# Patient Record
Sex: MALE
[De-identification: names, ages, dates, MRNs, and addresses within clinical notes are randomized per-mention and may not be internally consistent; named-entity substitution may affect disease eponyms.]

---

## 2017-03-26 ENCOUNTER — HOSPITAL ENCOUNTER (INPATIENT)
Dept: HOSPITAL 31 - C.ER | Age: 52
LOS: 4 days | Discharge: HOME | DRG: 885 | End: 2017-03-30
Attending: PSYCHIATRY & NEUROLOGY | Admitting: PSYCHIATRY & NEUROLOGY
Payer: COMMERCIAL

## 2017-03-26 VITALS — BODY MASS INDEX: 33 KG/M2

## 2017-03-26 DIAGNOSIS — F33.2: Primary | ICD-10-CM
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
## 2017-03-26 DIAGNOSIS — F17.210: ICD-10-CM

## 2017-03-26 DIAGNOSIS — R45.851: ICD-10-CM

## 2017-03-26 DIAGNOSIS — I10: ICD-10-CM

## 2017-03-26 DIAGNOSIS — F10.220: ICD-10-CM

## 2017-03-26 DIAGNOSIS — R45.850: ICD-10-CM

## 2017-03-26 DIAGNOSIS — F10.230: ICD-10-CM

## 2017-03-26 DIAGNOSIS — E78.00: ICD-10-CM

## 2017-03-26 DIAGNOSIS — Z79.84: ICD-10-CM

## 2017-03-26 DIAGNOSIS — E11.9: ICD-10-CM

## 2017-03-26 DIAGNOSIS — E66.3: ICD-10-CM

## 2017-03-26 LAB
ALBUMIN/GLOB SERPL: 1.7 {RATIO} (ref 1–2.1)
ALP SERPL-CCNC: 58 U/L (ref 38–126)
ALT SERPL-CCNC: 57 U/L (ref 21–72)
AST SERPL-CCNC: 48 U/L (ref 17–59)
BASOPHILS # BLD AUTO: 0 K/UL (ref 0–0.2)
BASOPHILS NFR BLD: 0.4 % (ref 0–2)
BILIRUB SERPL-MCNC: 0.1 MG/DL (ref 0.2–1.3)
BILIRUB UR-MCNC: NEGATIVE MG/DL
BUN SERPL-MCNC: 10 MG/DL (ref 9–20)
CALCIUM SERPL-MCNC: 9.3 MG/DL (ref 8.6–10.4)
CHLORIDE SERPL-SCNC: 95 MMOL/L (ref 98–107)
CO2 SERPL-SCNC: 30 MMOL/L (ref 22–30)
EOSINOPHIL # BLD AUTO: 0 K/UL (ref 0–0.7)
EOSINOPHIL NFR BLD: 0.4 % (ref 0–4)
ERYTHROCYTE [DISTWIDTH] IN BLOOD BY AUTOMATED COUNT: 15.8 % (ref 11.5–14.5)
ETHANOL SERPL-MCNC: 296 MG/DL (ref 0–10)
GLOBULIN SER-MCNC: 2.5 GM/DL (ref 2.2–3.9)
GLUCOSE SERPL-MCNC: 166 MG/DL (ref 75–110)
GLUCOSE UR STRIP-MCNC: NORMAL MG/DL
HCT VFR BLD CALC: 42.1 % (ref 35–51)
KETONES UR STRIP-MCNC: (no result) MG/DL
LEUKOCYTE ESTERASE UR-ACNC: (no result) LEU/UL
LYMPHOCYTES # BLD AUTO: 2.2 K/UL (ref 1–4.3)
LYMPHOCYTES NFR BLD AUTO: 42.7 % (ref 20–40)
MCH RBC QN AUTO: 29.8 PG (ref 27–31)
MCHC RBC AUTO-ENTMCNC: 33.3 G/DL (ref 33–37)
MCV RBC AUTO: 89.6 FL (ref 80–94)
MONOCYTES # BLD: 0.4 K/UL (ref 0–0.8)
MONOCYTES NFR BLD: 7.9 % (ref 0–10)
NRBC BLD AUTO-RTO: 0.1 % (ref 0–2)
PH UR STRIP: 6 [PH] (ref 5–8)
PLATELET # BLD: 347 K/UL (ref 130–400)
PMV BLD AUTO: 7.1 FL (ref 7.2–11.7)
POTASSIUM SERPL-SCNC: 3.7 MMOL/L (ref 3.6–5.2)
PROT SERPL-MCNC: 6.8 G/DL (ref 6.3–8.3)
PROT UR STRIP-MCNC: NEGATIVE MG/DL
RBC # UR STRIP: NEGATIVE /UL
SODIUM SERPL-SCNC: 142 MMOL/L (ref 132–148)
SP GR UR STRIP: 1.01 (ref 1–1.03)
UROBILINOGEN UR-MCNC: NORMAL MG/DL (ref 0.2–1)
WBC # BLD AUTO: 5.2 K/UL (ref 4.8–10.8)
WBC #/AREA URNS HPF: < 1 /HPF (ref 0–5)

## 2017-03-27 VITALS — OXYGEN SATURATION: 96 %

## 2017-03-27 PROCEDURE — GZ56ZZZ INDIVIDUAL PSYCHOTHERAPY, SUPPORTIVE: ICD-10-PCS | Performed by: PSYCHIATRY & NEUROLOGY

## 2017-03-27 PROCEDURE — GZHZZZZ GROUP PSYCHOTHERAPY: ICD-10-PCS | Performed by: PSYCHIATRY & NEUROLOGY

## 2017-03-27 PROCEDURE — GZ3ZZZZ MEDICATION MANAGEMENT: ICD-10-PCS | Performed by: PSYCHIATRY & NEUROLOGY

## 2017-03-27 PROCEDURE — HZ2ZZZZ DETOXIFICATION SERVICES FOR SUBSTANCE ABUSE TREATMENT: ICD-10-PCS | Performed by: PSYCHIATRY & NEUROLOGY

## 2017-03-27 RX ADMIN — PANTOPRAZOLE SODIUM SCH MG: 40 TABLET, DELAYED RELEASE ORAL at 11:13

## 2017-03-28 RX ADMIN — Medication SCH TAB: at 09:47

## 2017-03-28 RX ADMIN — PANTOPRAZOLE SODIUM SCH MG: 40 TABLET, DELAYED RELEASE ORAL at 09:46

## 2017-03-29 VITALS — RESPIRATION RATE: 19 BRPM

## 2017-03-29 RX ADMIN — PANTOPRAZOLE SODIUM SCH MG: 40 TABLET, DELAYED RELEASE ORAL at 10:14

## 2017-03-29 RX ADMIN — Medication SCH TAB: at 10:12

## 2017-03-30 VITALS — DIASTOLIC BLOOD PRESSURE: 75 MMHG | HEART RATE: 103 BPM | SYSTOLIC BLOOD PRESSURE: 116 MMHG | TEMPERATURE: 97.3 F

## 2017-03-30 RX ADMIN — Medication SCH TAB: at 09:51

## 2017-04-14 ENCOUNTER — HOSPITAL ENCOUNTER (OUTPATIENT)
Dept: HOSPITAL 31 - C.ER | Age: 52
Setting detail: OBSERVATION
LOS: 3 days | Discharge: HOME | End: 2017-04-17
Attending: INTERNAL MEDICINE | Admitting: INTERNAL MEDICINE
Payer: COMMERCIAL

## 2017-04-14 VITALS — RESPIRATION RATE: 20 BRPM

## 2017-04-14 VITALS — BODY MASS INDEX: 33 KG/M2

## 2017-04-14 DIAGNOSIS — E66.9: ICD-10-CM

## 2017-04-14 DIAGNOSIS — J44.1: Primary | ICD-10-CM

## 2017-04-14 DIAGNOSIS — Z23: ICD-10-CM

## 2017-04-14 DIAGNOSIS — E78.00: ICD-10-CM

## 2017-04-14 DIAGNOSIS — I10: ICD-10-CM

## 2017-04-14 DIAGNOSIS — G47.33: ICD-10-CM
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
## 2017-04-14 DIAGNOSIS — F17.200: ICD-10-CM

## 2017-04-14 DIAGNOSIS — E11.9: ICD-10-CM

## 2017-04-14 DIAGNOSIS — F19.10: ICD-10-CM

## 2017-04-14 DIAGNOSIS — F14.10: ICD-10-CM

## 2017-04-14 DIAGNOSIS — F10.120: ICD-10-CM

## 2017-04-14 LAB
ALBUMIN/GLOB SERPL: 1.6 {RATIO} (ref 1–2.1)
ALP SERPL-CCNC: 76 U/L (ref 38–126)
ALT SERPL-CCNC: 55 U/L (ref 21–72)
ARTERIAL PATENCY WRIST A: (no result)
ARTERIAL PATENCY WRIST A: (no result)
AST SERPL-CCNC: 54 U/L (ref 17–59)
BACTERIA #/AREA URNS HPF: (no result) /[HPF]
BASOPHILS # BLD AUTO: 0 K/UL (ref 0–0.2)
BASOPHILS NFR BLD: 0 % (ref 0–2)
BILIRUB SERPL-MCNC: 0.3 MG/DL (ref 0.2–1.3)
BILIRUB UR-MCNC: NEGATIVE MG/DL
BUN SERPL-MCNC: 14 MG/DL (ref 9–20)
CALCIUM SERPL-MCNC: 8 MG/DL (ref 8.6–10.4)
CHLORIDE SERPL-SCNC: 88 MMOL/L (ref 98–107)
CO2 SERPL-SCNC: 24 MMOL/L (ref 22–30)
DRAW SITE: (no result)
DRAW SITE: (no result)
EOSINOPHIL # BLD AUTO: 0 K/UL (ref 0–0.7)
EOSINOPHIL NFR BLD: 0 % (ref 0–4)
ERYTHROCYTE [DISTWIDTH] IN BLOOD BY AUTOMATED COUNT: 15.4 % (ref 11.5–14.5)
ETHANOL SERPL-MCNC: 200 MG/DL (ref 0–10)
GLOBULIN SER-MCNC: 2.8 GM/DL (ref 2.2–3.9)
GLUCOSE SERPL-MCNC: 172 MG/DL (ref 75–110)
GLUCOSE UR STRIP-MCNC: NORMAL MG/DL
HCT VFR BLD CALC: 40.3 % (ref 35–51)
KETONES UR STRIP-MCNC: NEGATIVE MG/DL
LEUKOCYTE ESTERASE UR-ACNC: (no result) LEU/UL
LYMPHOCYTES # BLD AUTO: 1.2 K/UL (ref 1–4.3)
LYMPHOCYTES NFR BLD AUTO: 15.6 % (ref 20–40)
MCH RBC QN AUTO: 30.3 PG (ref 27–31)
MCHC RBC AUTO-ENTMCNC: 33.3 G/DL (ref 33–37)
MCV RBC AUTO: 90.9 FL (ref 80–94)
MONOCYTES # BLD: 0.5 K/UL (ref 0–0.8)
MONOCYTES NFR BLD: 7.2 % (ref 0–10)
NRBC BLD AUTO-RTO: 0.2 % (ref 0–2)
PH UR STRIP: 6 [PH] (ref 5–8)
PLATELET # BLD: 219 K/UL (ref 130–400)
PMV BLD AUTO: 7.4 FL (ref 7.2–11.7)
POTASSIUM SERPL-SCNC: 4.7 MMOL/L (ref 3.6–5.2)
PROT SERPL-MCNC: 7.4 G/DL (ref 6.3–8.3)
PROT UR STRIP-MCNC: NEGATIVE MG/DL
RBC # UR STRIP: NEGATIVE /UL
RBC #/AREA URNS HPF: < 1 /HPF (ref 0–3)
SODIUM SERPL-SCNC: 131 MMOL/L (ref 132–148)
SP GR UR STRIP: 1.01 (ref 1–1.03)
UROBILINOGEN UR-MCNC: NORMAL MG/DL (ref 0.2–1)
WBC # BLD AUTO: 7.5 K/UL (ref 4.8–10.8)
WBC #/AREA URNS HPF: < 1 /HPF (ref 0–5)

## 2017-04-14 PROCEDURE — 82803 BLOOD GASES ANY COMBINATION: CPT

## 2017-04-14 PROCEDURE — 36600 WITHDRAWAL OF ARTERIAL BLOOD: CPT

## 2017-04-14 PROCEDURE — 81001 URINALYSIS AUTO W/SCOPE: CPT

## 2017-04-14 PROCEDURE — 96366 THER/PROPH/DIAG IV INF ADDON: CPT

## 2017-04-14 PROCEDURE — 96367 TX/PROPH/DG ADDL SEQ IV INF: CPT

## 2017-04-14 PROCEDURE — 82948 REAGENT STRIP/BLOOD GLUCOSE: CPT

## 2017-04-14 PROCEDURE — 36415 COLL VENOUS BLD VENIPUNCTURE: CPT

## 2017-04-14 PROCEDURE — 94640 AIRWAY INHALATION TREATMENT: CPT

## 2017-04-14 PROCEDURE — 83735 ASSAY OF MAGNESIUM: CPT

## 2017-04-14 PROCEDURE — 94660 CPAP INITIATION&MGMT: CPT

## 2017-04-14 PROCEDURE — 99285 EMERGENCY DEPT VISIT HI MDM: CPT

## 2017-04-14 PROCEDURE — 85730 THROMBOPLASTIN TIME PARTIAL: CPT

## 2017-04-14 PROCEDURE — 96376 TX/PRO/DX INJ SAME DRUG ADON: CPT

## 2017-04-14 PROCEDURE — 80053 COMPREHEN METABOLIC PANEL: CPT

## 2017-04-14 PROCEDURE — 84100 ASSAY OF PHOSPHORUS: CPT

## 2017-04-14 PROCEDURE — 96365 THER/PROPH/DIAG IV INF INIT: CPT

## 2017-04-14 PROCEDURE — 97116 GAIT TRAINING THERAPY: CPT

## 2017-04-14 PROCEDURE — 87040 BLOOD CULTURE FOR BACTERIA: CPT

## 2017-04-14 PROCEDURE — 93005 ELECTROCARDIOGRAM TRACING: CPT

## 2017-04-14 PROCEDURE — 96361 HYDRATE IV INFUSION ADD-ON: CPT

## 2017-04-14 PROCEDURE — 85025 COMPLETE CBC W/AUTO DIFF WBC: CPT

## 2017-04-14 PROCEDURE — 90732 PPSV23 VACC 2 YRS+ SUBQ/IM: CPT

## 2017-04-14 PROCEDURE — 80048 BASIC METABOLIC PNL TOTAL CA: CPT

## 2017-04-14 PROCEDURE — 97162 PT EVAL MOD COMPLEX 30 MIN: CPT

## 2017-04-14 PROCEDURE — 71010: CPT

## 2017-04-14 PROCEDURE — 96372 THER/PROPH/DIAG INJ SC/IM: CPT

## 2017-04-14 PROCEDURE — 96374 THER/PROPH/DIAG INJ IV PUSH: CPT

## 2017-04-15 LAB
BASOPHILS # BLD AUTO: 0 K/UL (ref 0–0.2)
BASOPHILS NFR BLD: 0.2 % (ref 0–2)
BUN SERPL-MCNC: 11 MG/DL (ref 9–20)
CALCIUM SERPL-MCNC: 7.8 MG/DL (ref 8.6–10.4)
CHLORIDE SERPL-SCNC: 92 MMOL/L (ref 98–107)
CO2 SERPL-SCNC: 33 MMOL/L (ref 22–30)
EOSINOPHIL # BLD AUTO: 0 K/UL (ref 0–0.7)
EOSINOPHIL NFR BLD: 0.2 % (ref 0–4)
ERYTHROCYTE [DISTWIDTH] IN BLOOD BY AUTOMATED COUNT: 15.6 % (ref 11.5–14.5)
GLUCOSE SERPL-MCNC: 159 MG/DL (ref 75–110)
HCT VFR BLD CALC: 39.4 % (ref 35–51)
LYMPHOCYTES # BLD AUTO: 0.9 K/UL (ref 1–4.3)
LYMPHOCYTES NFR BLD AUTO: 18.4 % (ref 20–40)
MAGNESIUM SERPL-MCNC: 1.7 MG/DL (ref 1.6–2.3)
MCH RBC QN AUTO: 30.2 PG (ref 27–31)
MCHC RBC AUTO-ENTMCNC: 33.2 G/DL (ref 33–37)
MCV RBC AUTO: 90.9 FL (ref 80–94)
MONOCYTES # BLD: 0.6 K/UL (ref 0–0.8)
MONOCYTES NFR BLD: 11.9 % (ref 0–10)
NRBC BLD AUTO-RTO: 0.4 % (ref 0–2)
PHOSPHATE SERPL-MCNC: 2.6 MG/DL (ref 2.5–4.5)
PLATELET # BLD: 164 K/UL (ref 130–400)
PMV BLD AUTO: 7.5 FL (ref 7.2–11.7)
POTASSIUM SERPL-SCNC: 4.1 MMOL/L (ref 3.6–5.2)
SODIUM SERPL-SCNC: 135 MMOL/L (ref 132–148)
WBC # BLD AUTO: 4.8 K/UL (ref 4.8–10.8)

## 2017-04-15 RX ADMIN — GLIPIZIDE SCH MG: 5 TABLET ORAL at 09:57

## 2017-04-15 RX ADMIN — IPRATROPIUM BROMIDE AND ALBUTEROL SULFATE SCH ML: .5; 3 SOLUTION RESPIRATORY (INHALATION) at 19:17

## 2017-04-15 RX ADMIN — HUMAN INSULIN SCH UNIT: 100 INJECTION, SOLUTION SUBCUTANEOUS at 11:57

## 2017-04-15 RX ADMIN — Medication SCH TAB: at 17:38

## 2017-04-15 RX ADMIN — GLIPIZIDE SCH MG: 5 TABLET ORAL at 17:38

## 2017-04-15 RX ADMIN — PANTOPRAZOLE SODIUM SCH MG: 40 TABLET, DELAYED RELEASE ORAL at 09:59

## 2017-04-15 RX ADMIN — HUMAN INSULIN SCH UNIT: 100 INJECTION, SOLUTION SUBCUTANEOUS at 07:58

## 2017-04-15 RX ADMIN — HUMAN INSULIN SCH: 100 INJECTION, SOLUTION SUBCUTANEOUS at 22:03

## 2017-04-15 RX ADMIN — IPRATROPIUM BROMIDE AND ALBUTEROL SULFATE SCH ML: .5; 3 SOLUTION RESPIRATORY (INHALATION) at 07:52

## 2017-04-15 RX ADMIN — HUMAN INSULIN SCH UNIT: 100 INJECTION, SOLUTION SUBCUTANEOUS at 17:38

## 2017-04-15 RX ADMIN — IPRATROPIUM BROMIDE AND ALBUTEROL SULFATE SCH ML: .5; 3 SOLUTION RESPIRATORY (INHALATION) at 13:45

## 2017-04-16 RX ADMIN — IPRATROPIUM BROMIDE AND ALBUTEROL SULFATE SCH ML: .5; 3 SOLUTION RESPIRATORY (INHALATION) at 13:13

## 2017-04-16 RX ADMIN — PANTOPRAZOLE SODIUM SCH MG: 40 TABLET, DELAYED RELEASE ORAL at 10:37

## 2017-04-16 RX ADMIN — IPRATROPIUM BROMIDE AND ALBUTEROL SULFATE SCH ML: .5; 3 SOLUTION RESPIRATORY (INHALATION) at 08:39

## 2017-04-16 RX ADMIN — GLIPIZIDE SCH MG: 5 TABLET ORAL at 17:35

## 2017-04-16 RX ADMIN — IPRATROPIUM BROMIDE AND ALBUTEROL SULFATE SCH ML: .5; 3 SOLUTION RESPIRATORY (INHALATION) at 01:24

## 2017-04-16 RX ADMIN — Medication SCH TAB: at 10:37

## 2017-04-16 RX ADMIN — GLIPIZIDE SCH MG: 5 TABLET ORAL at 10:37

## 2017-04-16 RX ADMIN — HUMAN INSULIN SCH UNIT: 100 INJECTION, SOLUTION SUBCUTANEOUS at 08:27

## 2017-04-16 RX ADMIN — IPRATROPIUM BROMIDE AND ALBUTEROL SULFATE SCH ML: .5; 3 SOLUTION RESPIRATORY (INHALATION) at 21:18

## 2017-04-17 VITALS
SYSTOLIC BLOOD PRESSURE: 143 MMHG | HEART RATE: 90 BPM | OXYGEN SATURATION: 97 % | DIASTOLIC BLOOD PRESSURE: 92 MMHG | TEMPERATURE: 97.7 F

## 2017-04-17 RX ADMIN — Medication SCH TAB: at 09:41

## 2017-04-17 RX ADMIN — IPRATROPIUM BROMIDE AND ALBUTEROL SULFATE SCH ML: .5; 3 SOLUTION RESPIRATORY (INHALATION) at 08:43

## 2017-04-17 RX ADMIN — IPRATROPIUM BROMIDE AND ALBUTEROL SULFATE SCH: .5; 3 SOLUTION RESPIRATORY (INHALATION) at 15:10

## 2017-04-17 RX ADMIN — GLIPIZIDE SCH MG: 5 TABLET ORAL at 17:00

## 2017-04-17 RX ADMIN — PANTOPRAZOLE SODIUM SCH MG: 40 TABLET, DELAYED RELEASE ORAL at 09:42

## 2017-04-17 RX ADMIN — IPRATROPIUM BROMIDE AND ALBUTEROL SULFATE SCH ML: .5; 3 SOLUTION RESPIRATORY (INHALATION) at 01:14

## 2017-04-17 RX ADMIN — GLIPIZIDE SCH MG: 5 TABLET ORAL at 09:40

## 2017-07-14 ENCOUNTER — HOSPITAL ENCOUNTER (INPATIENT)
Dept: HOSPITAL 31 - C.ER | Age: 52
LOS: 3 days | Discharge: LEFT BEFORE DISCHARGE | DRG: 918 | End: 2017-07-17
Attending: INTERNAL MEDICINE | Admitting: INTERNAL MEDICINE
Payer: COMMERCIAL

## 2017-07-14 VITALS — BODY MASS INDEX: 33 KG/M2

## 2017-07-14 DIAGNOSIS — F17.210: ICD-10-CM

## 2017-07-14 DIAGNOSIS — G47.30: ICD-10-CM

## 2017-07-14 DIAGNOSIS — Z79.84: ICD-10-CM

## 2017-07-14 DIAGNOSIS — E11.9: ICD-10-CM

## 2017-07-14 DIAGNOSIS — F10.230: ICD-10-CM

## 2017-07-14 DIAGNOSIS — I10: ICD-10-CM

## 2017-07-14 DIAGNOSIS — F33.2: ICD-10-CM

## 2017-07-14 DIAGNOSIS — E78.00: ICD-10-CM

## 2017-07-14 DIAGNOSIS — T46.4X2A: Primary | ICD-10-CM
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
## 2017-07-14 LAB
ALBUMIN SERPL-MCNC: 4 G/DL (ref 3.5–5)
ALBUMIN/GLOB SERPL: 1.5 {RATIO} (ref 1–2.1)
ALT SERPL-CCNC: 43 U/L (ref 21–72)
APAP SERPL-MCNC: < 10 UG/ML (ref 10–30)
AST SERPL-CCNC: 21 U/L (ref 17–59)
BASOPHILS # BLD AUTO: 0 K/UL (ref 0–0.2)
BASOPHILS NFR BLD: 0.1 % (ref 0–2)
BILIRUB UR-MCNC: NEGATIVE MG/DL
BUN SERPL-MCNC: 13 MG/DL (ref 9–20)
CALCIUM SERPL-MCNC: 7.8 MG/DL (ref 8.6–10.4)
EOSINOPHIL # BLD AUTO: 0 K/UL (ref 0–0.7)
EOSINOPHIL NFR BLD: 0.3 % (ref 0–4)
ERYTHROCYTE [DISTWIDTH] IN BLOOD BY AUTOMATED COUNT: 17 % (ref 11.5–14.5)
GFR NON-AFRICAN AMERICAN: > 60
GLUCOSE UR STRIP-MCNC: NORMAL MG/DL
HGB BLD-MCNC: 14.8 G/DL (ref 12–18)
LEUKOCYTE ESTERASE UR-ACNC: (no result) LEU/UL
LYMPHOCYTES # BLD AUTO: 1.9 K/UL (ref 1–4.3)
LYMPHOCYTES NFR BLD AUTO: 21.4 % (ref 20–40)
MCH RBC QN AUTO: 29.7 PG (ref 27–31)
MCHC RBC AUTO-ENTMCNC: 33.2 G/DL (ref 33–37)
MCV RBC AUTO: 89.5 FL (ref 80–94)
MONOCYTES # BLD: 0.7 K/UL (ref 0–0.8)
MONOCYTES NFR BLD: 8.2 % (ref 0–10)
NEUTROPHILS # BLD: 6.1 K/UL (ref 1.8–7)
NEUTROPHILS NFR BLD AUTO: 70 % (ref 50–75)
NRBC BLD AUTO-RTO: 0 % (ref 0–2)
PH UR STRIP: 5 [PH] (ref 5–8)
PLATELET # BLD: 292 K/UL (ref 130–400)
PMV BLD AUTO: 7.6 FL (ref 7.2–11.7)
PROT UR STRIP-MCNC: (no result) MG/DL
RBC # BLD AUTO: 4.98 MIL/UL (ref 4.4–5.9)
RBC # UR STRIP: NEGATIVE /UL
SALICYLATE: < 1 MG/DL 1
SP GR UR STRIP: 1.02 (ref 1–1.03)
URINE NITRATE: NEGATIVE
UROBILINOGEN UR-MCNC: NORMAL MG/DL (ref 0.2–1)
WBC # BLD AUTO: 8.7 K/UL (ref 4.8–10.8)

## 2017-07-14 RX ADMIN — PANTOPRAZOLE SODIUM SCH MG: 40 TABLET, DELAYED RELEASE ORAL at 10:25

## 2017-07-15 RX ADMIN — Medication SCH TAB: at 10:47

## 2017-07-15 RX ADMIN — PANTOPRAZOLE SODIUM SCH MG: 40 TABLET, DELAYED RELEASE ORAL at 10:47

## 2017-07-16 RX ADMIN — DIVALPROEX SODIUM SCH MG: 250 TABLET, DELAYED RELEASE ORAL at 18:44

## 2017-07-16 RX ADMIN — Medication SCH TAB: at 10:48

## 2017-07-16 RX ADMIN — PANTOPRAZOLE SODIUM SCH MG: 40 TABLET, DELAYED RELEASE ORAL at 10:48

## 2017-07-16 RX ADMIN — DIVALPROEX SODIUM SCH MG: 250 TABLET, DELAYED RELEASE ORAL at 13:10

## 2017-07-17 VITALS
TEMPERATURE: 98 F | DIASTOLIC BLOOD PRESSURE: 44 MMHG | RESPIRATION RATE: 20 BRPM | SYSTOLIC BLOOD PRESSURE: 118 MMHG | HEART RATE: 98 BPM

## 2017-07-17 VITALS — OXYGEN SATURATION: 95 %

## 2017-07-17 RX ADMIN — PANTOPRAZOLE SODIUM SCH MG: 40 TABLET, DELAYED RELEASE ORAL at 09:10

## 2017-07-17 RX ADMIN — Medication SCH TAB: at 09:10

## 2017-07-17 RX ADMIN — DIVALPROEX SODIUM SCH MG: 250 TABLET, DELAYED RELEASE ORAL at 09:12

## 2017-10-21 ENCOUNTER — HOSPITAL ENCOUNTER (INPATIENT)
Dept: HOSPITAL 31 - C.ER | Age: 52
LOS: 3 days | Discharge: HOME | DRG: 313 | End: 2017-10-24
Attending: INTERNAL MEDICINE | Admitting: INTERNAL MEDICINE
Payer: COMMERCIAL

## 2017-10-21 VITALS — BODY MASS INDEX: 33 KG/M2

## 2017-10-21 DIAGNOSIS — J44.9: ICD-10-CM

## 2017-10-21 DIAGNOSIS — E11.9: ICD-10-CM

## 2017-10-21 DIAGNOSIS — F17.200: ICD-10-CM

## 2017-10-21 DIAGNOSIS — G47.30: ICD-10-CM

## 2017-10-21 DIAGNOSIS — F10.129: ICD-10-CM

## 2017-10-21 DIAGNOSIS — F32.9: ICD-10-CM

## 2017-10-21 DIAGNOSIS — Z79.82: ICD-10-CM

## 2017-10-21 DIAGNOSIS — Z87.01: ICD-10-CM

## 2017-10-21 DIAGNOSIS — E78.00: ICD-10-CM

## 2017-10-21 DIAGNOSIS — R07.89: Primary | ICD-10-CM

## 2017-10-21 DIAGNOSIS — I10: ICD-10-CM

## 2017-10-21 LAB
ALBUMIN/GLOB SERPL: 2.3 {RATIO} (ref 1–2.1)
ALP SERPL-CCNC: 57 U/L (ref 38–126)
ALT SERPL-CCNC: 44 U/L (ref 21–72)
AST SERPL-CCNC: 48 U/L (ref 17–59)
BASOPHILS # BLD AUTO: 0 K/UL (ref 0–0.2)
BASOPHILS NFR BLD: 0.3 % (ref 0–2)
BILIRUB SERPL-MCNC: 0.7 MG/DL (ref 0.2–1.3)
BILIRUB UR-MCNC: NEGATIVE MG/DL
BUN SERPL-MCNC: 25 MG/DL (ref 9–20)
CALCIUM SERPL-MCNC: 8.6 MG/DL (ref 8.6–10.4)
CHLORIDE SERPL-SCNC: 97 MMOL/L (ref 98–107)
CO2 SERPL-SCNC: 19 MMOL/L (ref 22–30)
EOSINOPHIL # BLD AUTO: 0 K/UL (ref 0–0.7)
EOSINOPHIL NFR BLD: 0.5 % (ref 0–4)
ERYTHROCYTE [DISTWIDTH] IN BLOOD BY AUTOMATED COUNT: 16.2 % (ref 11.5–14.5)
ETHANOL SERPL-MCNC: 286 MG/DL (ref 0–10)
GLOBULIN SER-MCNC: 2.1 GM/DL (ref 2.2–3.9)
GLUCOSE SERPL-MCNC: 118 MG/DL (ref 75–110)
GLUCOSE UR STRIP-MCNC: NORMAL MG/DL
HCT VFR BLD CALC: 44.1 % (ref 35–51)
KETONES UR STRIP-MCNC: (no result) MG/DL
LEUKOCYTE ESTERASE UR-ACNC: (no result) LEU/UL
LYMPHOCYTES # BLD AUTO: 2.8 K/UL (ref 1–4.3)
LYMPHOCYTES NFR BLD AUTO: 31.8 % (ref 20–40)
MCH RBC QN AUTO: 31.4 PG (ref 27–31)
MCHC RBC AUTO-ENTMCNC: 34.1 G/DL (ref 33–37)
MCV RBC AUTO: 92 FL (ref 80–94)
MONOCYTES # BLD: 0.7 K/UL (ref 0–0.8)
MONOCYTES NFR BLD: 8.2 % (ref 0–10)
NRBC BLD AUTO-RTO: 0.1 % (ref 0–2)
PH UR STRIP: 5 [PH] (ref 5–8)
PLATELET # BLD: 265 K/UL (ref 130–400)
PMV BLD AUTO: 7.7 FL (ref 7.2–11.7)
POTASSIUM SERPL-SCNC: 4 MMOL/L (ref 3.6–5.2)
PROT SERPL-MCNC: 7 G/DL (ref 6.3–8.3)
PROT UR STRIP-MCNC: NEGATIVE MG/DL
RBC # UR STRIP: NEGATIVE /UL
RBC #/AREA URNS HPF: < 1 /HPF (ref 0–3)
SODIUM SERPL-SCNC: 136 MMOL/L (ref 132–148)
SP GR UR STRIP: 1.01 (ref 1–1.03)
UROBILINOGEN UR-MCNC: NORMAL MG/DL (ref 0.2–1)
WBC # BLD AUTO: 8.9 K/UL (ref 4.8–10.8)
WBC #/AREA URNS HPF: < 1 /HPF (ref 0–5)

## 2017-10-21 RX ADMIN — INSULIN ASPART SCH: 100 INJECTION, SOLUTION INTRAVENOUS; SUBCUTANEOUS at 22:02

## 2017-10-22 RX ADMIN — ENOXAPARIN SODIUM SCH MG: 40 INJECTION SUBCUTANEOUS at 09:57

## 2017-10-22 RX ADMIN — INSULIN ASPART SCH UNIT: 100 INJECTION, SOLUTION INTRAVENOUS; SUBCUTANEOUS at 17:35

## 2017-10-22 RX ADMIN — INSULIN ASPART SCH: 100 INJECTION, SOLUTION INTRAVENOUS; SUBCUTANEOUS at 21:49

## 2017-10-22 RX ADMIN — ASCORBIC ACID, VITAMIN A PALMITATE, CHOLECALCIFEROL, THIAMINE HYDROCHLORIDE, RIBOFLAVIN-5 PHOSPHATE SODIUM, PYRIDOXINE HYDROCHLORIDE, NIACINAMIDE, DEXPANTHENOL, ALPHA-TOCOPHEROL ACETATE, VITAMIN K1, FOLIC ACID, BIOTIN, CYANOCOBALAMIN SCH MLS/HR: 200; 3300; 200; 6; 3.6; 6; 40; 15; 10; 150; 600; 60; 5 INJECTION, SOLUTION INTRAVENOUS at 10:07

## 2017-10-22 RX ADMIN — INSULIN ASPART SCH UNIT: 100 INJECTION, SOLUTION INTRAVENOUS; SUBCUTANEOUS at 12:00

## 2017-10-22 RX ADMIN — INSULIN ASPART SCH: 100 INJECTION, SOLUTION INTRAVENOUS; SUBCUTANEOUS at 07:30

## 2017-10-23 VITALS — RESPIRATION RATE: 20 BRPM

## 2017-10-23 LAB
ALBUMIN/GLOB SERPL: 2 {RATIO} (ref 1–2.1)
ALP SERPL-CCNC: 69 U/L (ref 38–126)
ALT SERPL-CCNC: 33 U/L (ref 21–72)
AST SERPL-CCNC: 20 U/L (ref 17–59)
BILIRUB SERPL-MCNC: 0.6 MG/DL (ref 0.2–1.3)
BUN SERPL-MCNC: 16 MG/DL (ref 9–20)
CALCIUM SERPL-MCNC: 8.5 MG/DL (ref 8.6–10.4)
CHLORIDE SERPL-SCNC: 99 MMOL/L (ref 98–107)
CO2 SERPL-SCNC: 25 MMOL/L (ref 22–30)
ERYTHROCYTE [DISTWIDTH] IN BLOOD BY AUTOMATED COUNT: 15.7 % (ref 11.5–14.5)
GLOBULIN SER-MCNC: 2 GM/DL (ref 2.2–3.9)
GLUCOSE SERPL-MCNC: 162 MG/DL (ref 75–110)
HCT VFR BLD CALC: 40.9 % (ref 35–51)
MCH RBC QN AUTO: 31.2 PG (ref 27–31)
MCHC RBC AUTO-ENTMCNC: 34.1 G/DL (ref 33–37)
MCV RBC AUTO: 91.5 FL (ref 80–94)
PLATELET # BLD: 189 K/UL (ref 130–400)
PMV BLD AUTO: 7.6 FL (ref 7.2–11.7)
POTASSIUM SERPL-SCNC: 3.8 MMOL/L (ref 3.6–5.2)
PROT SERPL-MCNC: 5.9 G/DL (ref 6.3–8.3)
SODIUM SERPL-SCNC: 134 MMOL/L (ref 132–148)
WBC # BLD AUTO: 4.6 K/UL (ref 4.8–10.8)

## 2017-10-23 RX ADMIN — ENOXAPARIN SODIUM SCH MG: 40 INJECTION SUBCUTANEOUS at 14:47

## 2017-10-23 RX ADMIN — INSULIN ASPART SCH: 100 INJECTION, SOLUTION INTRAVENOUS; SUBCUTANEOUS at 07:45

## 2017-10-23 RX ADMIN — INSULIN ASPART SCH UNIT: 100 INJECTION, SOLUTION INTRAVENOUS; SUBCUTANEOUS at 16:50

## 2017-10-23 RX ADMIN — INSULIN ASPART SCH: 100 INJECTION, SOLUTION INTRAVENOUS; SUBCUTANEOUS at 12:45

## 2017-10-23 RX ADMIN — ASCORBIC ACID, VITAMIN A PALMITATE, CHOLECALCIFEROL, THIAMINE HYDROCHLORIDE, RIBOFLAVIN-5 PHOSPHATE SODIUM, PYRIDOXINE HYDROCHLORIDE, NIACINAMIDE, DEXPANTHENOL, ALPHA-TOCOPHEROL ACETATE, VITAMIN K1, FOLIC ACID, BIOTIN, CYANOCOBALAMIN SCH MLS/HR: 200; 3300; 200; 6; 3.6; 6; 40; 15; 10; 150; 600; 60; 5 INJECTION, SOLUTION INTRAVENOUS at 14:47

## 2017-10-23 RX ADMIN — INSULIN ASPART SCH: 100 INJECTION, SOLUTION INTRAVENOUS; SUBCUTANEOUS at 22:00

## 2017-10-24 VITALS — HEART RATE: 87 BPM

## 2017-10-24 VITALS — SYSTOLIC BLOOD PRESSURE: 124 MMHG | DIASTOLIC BLOOD PRESSURE: 79 MMHG | OXYGEN SATURATION: 95 % | TEMPERATURE: 97.6 F

## 2017-10-24 RX ADMIN — ENOXAPARIN SODIUM SCH MG: 40 INJECTION SUBCUTANEOUS at 09:08

## 2017-10-24 RX ADMIN — INSULIN ASPART SCH UNIT: 100 INJECTION, SOLUTION INTRAVENOUS; SUBCUTANEOUS at 08:15

## 2017-12-08 ENCOUNTER — HOSPITAL ENCOUNTER (INPATIENT)
Dept: HOSPITAL 31 - C.ER | Age: 52
LOS: 3 days | Discharge: HOME | DRG: 897 | End: 2017-12-11
Attending: PSYCHIATRY & NEUROLOGY | Admitting: PSYCHIATRY & NEUROLOGY
Payer: COMMERCIAL

## 2017-12-08 VITALS — OXYGEN SATURATION: 95 %

## 2017-12-08 DIAGNOSIS — F10.230: Primary | ICD-10-CM

## 2017-12-08 DIAGNOSIS — E11.9: ICD-10-CM

## 2017-12-08 DIAGNOSIS — F31.63: ICD-10-CM

## 2017-12-08 DIAGNOSIS — R45.851: ICD-10-CM

## 2017-12-08 DIAGNOSIS — F41.1: ICD-10-CM

## 2017-12-08 DIAGNOSIS — F10.220: ICD-10-CM

## 2017-12-08 DIAGNOSIS — I10: ICD-10-CM
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
## 2017-12-08 LAB
ALBUMIN/GLOB SERPL: 1.9 {RATIO} (ref 1–2.1)
ALP SERPL-CCNC: 122 U/L (ref 38–126)
ALT SERPL-CCNC: 51 U/L (ref 21–72)
AST SERPL-CCNC: 28 U/L (ref 17–59)
BASOPHILS # BLD AUTO: 0 K/UL (ref 0–0.2)
BASOPHILS NFR BLD: 0.2 % (ref 0–2)
BILIRUB SERPL-MCNC: 0.4 MG/DL (ref 0.2–1.3)
BILIRUB UR-MCNC: NEGATIVE MG/DL
BUN SERPL-MCNC: 19 MG/DL (ref 9–20)
CALCIUM SERPL-MCNC: 8.7 MG/DL (ref 8.6–10.4)
CHLORIDE SERPL-SCNC: 100 MMOL/L (ref 98–107)
CO2 SERPL-SCNC: 24 MMOL/L (ref 22–30)
EOSINOPHIL # BLD AUTO: 0.1 K/UL (ref 0–0.7)
EOSINOPHIL NFR BLD: 0.9 % (ref 0–4)
ERYTHROCYTE [DISTWIDTH] IN BLOOD BY AUTOMATED COUNT: 14.4 % (ref 11.5–14.5)
ETHANOL SERPL-MCNC: 257 MG/DL (ref 0–10)
GLOBULIN SER-MCNC: 2.3 GM/DL (ref 2.2–3.9)
GLUCOSE SERPL-MCNC: 234 MG/DL (ref 75–110)
GLUCOSE UR STRIP-MCNC: (no result) MG/DL
HCT VFR BLD CALC: 43.8 % (ref 35–51)
KETONES UR STRIP-MCNC: (no result) MG/DL
LEUKOCYTE ESTERASE UR-ACNC: (no result) LEU/UL
LYMPHOCYTES # BLD AUTO: 1.4 K/UL (ref 1–4.3)
LYMPHOCYTES NFR BLD AUTO: 22.8 % (ref 20–40)
MCH RBC QN AUTO: 31.3 PG (ref 27–31)
MCHC RBC AUTO-ENTMCNC: 34 G/DL (ref 33–37)
MCV RBC AUTO: 91.9 FL (ref 80–94)
MONOCYTES # BLD: 0.4 K/UL (ref 0–0.8)
MONOCYTES NFR BLD: 7.1 % (ref 0–10)
NRBC BLD AUTO-RTO: 0.2 % (ref 0–2)
PH UR STRIP: 6 [PH] (ref 5–8)
PLATELET # BLD: 231 K/UL (ref 130–400)
PMV BLD AUTO: 7.8 FL (ref 7.2–11.7)
POTASSIUM SERPL-SCNC: 3.9 MMOL/L (ref 3.6–5.2)
PROT SERPL-MCNC: 6.5 G/DL (ref 6.3–8.3)
PROT UR STRIP-MCNC: NEGATIVE MG/DL
RBC # UR STRIP: NEGATIVE /UL
SODIUM SERPL-SCNC: 142 MMOL/L (ref 132–148)
SP GR UR STRIP: 1 (ref 1–1.03)
UROBILINOGEN UR-MCNC: NORMAL MG/DL (ref 0.2–1)
WBC # BLD AUTO: 6.1 K/UL (ref 4.8–10.8)
WBC #/AREA URNS HPF: < 1 /HPF (ref 0–5)

## 2017-12-08 PROCEDURE — HZ2ZZZZ DETOXIFICATION SERVICES FOR SUBSTANCE ABUSE TREATMENT: ICD-10-PCS | Performed by: PSYCHIATRY & NEUROLOGY

## 2017-12-08 RX ADMIN — INSULIN GLARGINE SCH UNIT: 100 INJECTION, SOLUTION SUBCUTANEOUS at 21:30

## 2017-12-08 NOTE — PCM.BM
<Sundeep Morgan - Last Filed: 12/08/17 18:44>





Treatment Plan Problems





- Problems identified on initial assessmt


  ** Depression


Date Initiated: 12/08/17


Time Initiated: 17:30


Assessment reference: NA


Status: Active





  ** Alcohol abuse


Date Initiated: 12/08/17


Time Initiated: 17:30


Assessment reference: NA


Status: Active





Treatment assets and liabiliti


Patient Assests: cooperative, self-reliant, ADL independent, good support system

, financial stabiity


Patient Liabilities: physical pain, substance abuse (Alcohol), medical problems





- Milieu Protocol


Maintain good personal hygiene: daily Encourage regular showers, daily Remind 

patient to perform daily oral care


Conduct patient checks and document Observation sheet: Q15 minutes


Maintain personal safety: every shift Educate patient to report safety concerns 

to staff, every shift Monitor environment for contraband/sharps


Medication safety: Monitor for expected outcome, potential side effects: every 

shift, Assess barriers to learning: every shift, Assess readiness for 

medication education: every shift





<Dalia Brewster - Last Filed: 12/11/17 10:26>





Discharge/Continuing Care





- Education Needs


Education Needs: Patient Medication, Patient Coping Skills





- Discharge


Discharge Criteria: Tolerates medication w/o severe side effects, Reduction of 

target symptoms

## 2017-12-08 NOTE — C.PDOC
History Of Present Illness


52 year old male, with PMHx of depression, anxiety, and alcohol abuse, presents 

to ED for evaluation of depression and anxiety. Pt admits to drinking alcohol 

today. Denies any SI, HI, or other active physical complaints at this time.





Time Seen by Provider: 12/08/17 12:42


Chief Complaint (Nursing): Psychiatric Evaluation


History Per: Patient


History/Exam Limitations: no limitations


Onset/Duration Of Symptoms: Gradual


Current Symptoms Are (Timing): Still Present


Suicide/Self Injury Attempted (Context): None


Modifying Factor(s): Alcohol


Severity: None


Pain Scale Rating Of: 0


Associated Symptoms: Anxiety, Depression.  denies: Suicidal Thoughts, Suicidal 

Plan


Involuntary Hold By: None


Recent travel outside of the [place]: No


Additional History Per: Patient





Past Medical History


Reviewed: Historical Data, Nursing Documentation, Vital Signs


Vital Signs: 


 Last Vital Signs











Temp  97.8 F   12/08/17 12:20


 


Pulse  102 H  12/08/17 14:07


 


Resp  18   12/08/17 14:07


 


BP  102/83   12/08/17 14:07


 


Pulse Ox  92 L  12/08/17 15:19














- Medical History


PMH: Anxiety, Depression


Family History: States: Unknown Family Hx





- Social History


Hx Alcohol Use: Yes


Hx Substance Use: No





- Immunization History


Hx Tetanus Toxoid Vaccination: No


Hx Influenza Vaccination: No


Hx Pneumococcal Vaccination: No





Review Of Systems


Except As Marked, All Systems Reviewed And Found Negative.


Constitutional: Negative for: Fever, Chills


Cardiovascular: Negative for: Chest Pain, Palpitations


Respiratory: Negative for: Shortness of Breath


Gastrointestinal: Negative for: Nausea, Vomiting, Abdominal Pain


Neurological: Negative for: Headache, Dizziness


Psych: Positive for: Anxiety, Depression.  Negative for: Suicidal ideation





Physical Exam





- Physical Exam


Appears: Non-toxic, No Acute Distress, Other (morbidly obese)


Skin: Normal Color, Warm, Dry


Head: Atraumatic, Normacephalic


Eye(s): bilateral: Normal Inspection


Oral Mucosa: Moist, Other (EtOH on breath)


Neck: Normal ROM, Supple


Chest: Symmetrical


Cardiovascular: Rhythm Regular, No Murmur


Respiratory: Normal Breath Sounds, No Rales, No Rhonchi, No Wheezing


Gastrointestinal/Abdominal: Soft, No Tenderness


Extremity: Normal ROM


Neurological/Psych: Oriented x3, Normal Speech


Gait: Steady





ED Course And Treatment





- Laboratory Results


Result Diagrams: 


 12/08/17 13:04





 12/08/17 13:04


Lab Interpretation: Abnormal (etoh 257 H)


O2 Sat by Pulse Oximetry: 92


Reevaluation Time: 16:54


Reassessment Condition: Improved





- Physician Consult Information


Outcome Of Conversation: 1700: d/w Crisis, ok to 5E, Psych





Medical Decision Making


Medical Decision Making: 


Blood work, UA ordered and reviewed. 











Disposition


Doctor Will See Patient In The: Hospital


Counseled Patient/Family Regarding: Studies Performed, Diagnosis





- Disposition


Disposition: HOSPITALIZED


Disposition Time: 16:55


Condition: GOOD


Forms:  [employer] (English)





- Clinical Impression


Clinical Impression: 


 Alcohol abuse, Manic bipolar I disorder








- Scribe Statement


The provider has reviewed the documentation as recorded by the Scribe


Marco Antonio Mcneal





All medical record entries made by the Dorinaibe were at my direction and 

personally dictated by me. I have reviewed the chart and agree that the record 

accurately reflects my personal performance of the history, physical exam, 

medical decision making, and the department course for this patient. I have 

also personally directed, reviewed, and agree with the discharge instructions 

and disposition.

## 2017-12-09 RX ADMIN — INSULIN GLARGINE SCH UNIT: 100 INJECTION, SOLUTION SUBCUTANEOUS at 21:17

## 2017-12-09 NOTE — PCM.PSYCH
Initial Psychiatric Evaluation





- Initial Psychiatric Evaluation


Type of Admission: Voluntary


Legal Status: Capacity


Chief Complaint (in patient's own words): 





I was feeling suicidal.'


History of Present Illness and Precipitating Events: 


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 

currently unemployed, came to the hospital suicidal thoughts. 





Pt reports history of bipolar disorder and alcohol dependence. As per the ED 

notes, patient stated I cant live like this he also mentioned that he is 

too afraid to attempt suicide.  Pt reported that, he has been struggling with 

increased symptoms of depression however he is unable to identify a 

precipitating event. Patient stated he has had no energy and has seen sleeping 

all day on the couch. Patient reported increased anxiety in addition to 

frequent anxiety attacks. Patient was unable to specify exactly how long he has 

experienced these increased symptoms.





Pt reports of drinking 3-4 pints of vodka daily. He reports withdrawal symptoms 

including shakes, sweating, anxiety and headaches. Patient reports anxiety, 

irritability and reports feelings of hopelessness or helplessness. He denies 

any auditory or visual hallucinations or any psychotic symptoms. He denies any 

other substance abuse. 





PMH:


DM, HTN


Current Medications: 





Active Medications











Generic Name Dose Route Start Last Admin





  Trade Name Freq  PRN Reason Stop Dose Admin


 


Atenolol  50 mg  12/08/17 22:52  12/09/17 09:25





  Tenormin  PO   50 mg





  BID NELL   Administration


 


Buspirone HCl  30 mg  12/08/17 19:30  12/09/17 09:24





  Buspar  PO   30 mg





  BID NELL   Administration


 


Chlordiazepoxide  25 mg  12/09/17 00:00  12/09/17 06:01





  Librium  PO  12/12/17 23:59  25 mg





  Q6 NELL   Administration





  Taper   


 


Gabapentin  400 mg  12/08/17 19:30  12/09/17 09:24





  Neurontin  PO   400 mg





  TID NELL   Administration


 


Glipizide  10 mg  12/09/17 07:30  12/09/17 07:52





  Glucotrol  PO   10 mg





  ACB NELL   Administration


 


Insulin Glargine  20 unit  12/08/17 22:00  12/08/17 21:30





  Lantus  SC   20 unit





  HS NELL   Administration


 


Metformin HCl  1,000 mg  12/08/17 19:30  12/09/17 09:24





  Glucophage  PO   1,000 mg





  BID NELL   Administration


 


Quetiapine Fumarate  300 mg  12/08/17 22:00  12/08/17 21:27





  Seroquel  PO   300 mg





  HS NELL   Administration


 


Rosuvastatin Calcium  10 mg  12/08/17 22:00  12/08/17 21:27





  Crestor  PO   10 mg





  HS NELL   Administration














Past Psychiatric History





- Past Psychiatric History


Previous Treatment History: Inpatient


Pertinent Medical Hx (Current Medical&Sleep Prob, Allergies): 





 Allergies











Allergy/AdvReac Type Severity Reaction Status Date / Time


 


No Known Allergies Allergy   Unverified 12/08/17 12:22








 





Atenolol 50 mg PO BID 12/08/17 


Buspar 30 mg PO DAILY 12/08/17 


GlipiZIDE 10 mg PO DAILY 12/08/17 


Lantus 30 unit SQ HS 12/08/17 


Protonix 40 mg PO DAILY 12/08/17 


Simvastatin 10 mg PO HS 12/08/17 


metFORMIN 1 gm PO BID 12/08/17 











Review of Systems





- Review of Systems


All systems: reviewed and no additional remarkable complaints except





- Psychiatric


Psychiatric: Anxiety, Behavioral Changes, Change in Appetite, Hopelessness, 

Irritability, Suicidal Ideation





Mental Status Examination





- Personal Presentation


Personal Presentation: Looks stated age





- Affect


Affect: Constricted, Depressed





- Motor Activity


Motor Activity: Calm





- Reliability in Providing Information


Reliability in Providing Information: Good





- Speech


Speech: Organized





- Mood


Mood: Depressed, Anxious





- Obsessions/Compulsions


Obsessions: No


Compulsions: No





- Cognitive Functions


Orientation: Person, Place, Situation, Time


Sensorium: Alert


Attention/Concentration: Attentive


Abstract Thinking: Elizabeth


Estimate of Intelligence: Below average


Judgement: Imparied, as evidence by: Poor judgement, Imparied, as evidence by: 

Lack of insight into illness





- Risk


Risk: Suicidal, Withdrawal, Diminished functioning





- Strength & Assets Inventory


Strength & Assets Inventory: Family support





DSM 5 DX





- DSM 5


DSM 5 Diagnosis: 





Bipolar mixed severe without psychotic features 


Alcohol use disorder severe


Alcohol withdrawal uncomplicated








- Recommended/Plan of Treatment


Treatment Recommendations and Plan of Treatment: 





Bipolar mixed severe without psychotic features 


CBT   


Psychoeducation


Supportive therapy, group therapy, individual therapy


Cogentin 1 mg by mouth twice a day


Seroquel 300 mg by mouth QHS


Trazodone 100 mg by mouth daily at bedtime





Alcohol use disorder severe


CBT


Psychoeducation


Supportive therapy, individual therapy


Use MI for abstinence





Alcohol withdrawal uncomplicated


CBT


Psychoeducation


Supportive therapy, individual therapy


Librium when necessary


Start Librium taper 


Start folic acid/thiamine/multivitamin











- Smoking Cessation


Smoking Cessation Initiated: No

## 2017-12-10 RX ADMIN — INSULIN GLARGINE SCH UNIT: 100 INJECTION, SOLUTION SUBCUTANEOUS at 21:12

## 2017-12-10 NOTE — PCM.PYCHPN
Psychiatric Progress Note





- Psychiatric Progress Note


Patient seen today, length of contact: 15 minute


Patient Chief Complaint: 





I want shock therapy for me.


Problems Identified/Issues Discussed: 





Patient seen, chart reviewed, case discussed with the staff.





Issues related to illness and treatment were discussed with the patient and 

staff.





Reported compliant with treatment with no adverse effects.





Feeling much better.





Patient was keeps asking for shock therapy for his mood.  Education provided 

about ECT.





Aftercare discussed with the patient.





Patient was awake, alert and oriented 3.





Denied any delusions, auditory or visual hallucinations, suicidal ideations or 

homicidal ideations at the time of evaluation..


Medical Problems: 





Diabetes mellitus


Diagnostic Results: 





Reviewed


DSM 5 Symptoms Update: 





Improving with treatment


Medication Change: No


Medical Record Reviewed: Yes





Mental Status Examination





- Cognitive Function


Orientation: Person, Place, Situation, Time


Memory: Intact


Attention: WNL


Concentration: WNL


Association: WNL


Fund of Knowledge: [facility]


Decription of patient's judgement and insights: 





Fair





- Mood


Mood: Neutral





- Affect


Affect: Other (Appropriate)





- Speech


Speech: Appropriate





- Formal Thought Process


Formal Thought Process: No Impairment


Psychotic Thoughts and Behaviors: 





None





- Suicidal Ideation


Suicidal Ideation: No





- Homicidal Ideation


Homicidal Ideation: No





Goal/Treatment Plan





- Goal/Treatment Plan


Need for Continued Stay: Remain at risks for inpatient hospitalization, 

Discharge may exacerbated symptoms, Severe functional impairment


Progress Toward Problem(s) and Goals/Treatment Plan: 





Patient education


Supportive therapy


Continue treatment as before.


Estimated Date of D/C: 12/15/17





- Smoking Cessation


Smoking Cessation Initiated: No

## 2017-12-11 VITALS
RESPIRATION RATE: 16 BRPM | TEMPERATURE: 98 F | DIASTOLIC BLOOD PRESSURE: 75 MMHG | HEART RATE: 91 BPM | SYSTOLIC BLOOD PRESSURE: 120 MMHG

## 2017-12-11 NOTE — PCM.PYCHDC
Mental Status Examination





- Mental Status Examination


Orientation: Person, Place, Situation, Time


Memory: Intact


Mood: Neutral


Affect: Constricted


Speech: Soft


Attention: WNL


Concentration: WNL


Association: WNL


Fund of Knowledge: WNL


Formal Thought Process: No Impairment


Description of patient's judgement and insight: 





good, fair


Psychotic Thoughts and Behaviors: 





denies any AVH


Suicidal Ideation: No


Current Homicidal Ideation?: No





Discharge Summary





- Discharge Note


Reason for Hospitalization: 





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 

currently unemployed, came to the hospital suicidal thoughts. 





Pt reports history of bipolar disorder and alcohol dependence. As per the ED 

notes, patient stated I cant live like this he also mentioned that he is 

too afraid to attempt suicide.  Pt reported that, he has been struggling with 

increased symptoms of depression however he is unable to identify a 

precipitating event. Patient stated he has had no energy and has seen sleeping 

all day on the couch. Patient reported increased anxiety in addition to 

frequent anxiety attacks. Patient was unable to specify exactly how long he has 

experienced these increased symptoms.





Pt reports of drinking 3-4 pints of vodka daily. He reports withdrawal symptoms 

including shakes, sweating, anxiety and headaches. Patient reports anxiety, 

irritability and reports feelings of hopelessness or helplessness. He denies 

any auditory or visual hallucinations or any psychotic symptoms. He denies any 

other substance abuse. 





Laboratory Data: 





 Abnormal Lab Results











  12/10/17 12/11/17





  15:53 07:55


 


POC Glucose (mg/dL)  186 H  203 H











Consultations:: List each consultation separately and include:  1. Reason for 

request.  2. Findings.  3. Follow-up


Summary of Hospital Course include:: 1. Description of specific treatment plan 

utilized for patients during their course of treatmen.  2. Summarize the time-

course for resolution of acute symptoms and/or regressed behaviors.  3. 

Describe issues identified and worked on during hospitalization.  4. Describe 

medication utilized.  5. Describe medical problems identified and treated.  6. 

Reassessment of suicide risk


Summary of Hospital Course: 


During the course of his stay, patient (pt) started progressively improving and 

he no longer remained irritable, depressed, and suicidal. His mood was improved 

and he started attending groups and meetings and started socializing. 





Patient denied any feelings of hopelessness, helplessness, and worthlessness, 

denied any problem with the sleep or appetite, denied suicidal ideation or 

homicidal ideation. Pt denied any auditory or visual hallucinations.  





Some changes were made in his current medications and patient was discharged on 

following medications. He tolerated these medications very well and denied any 

side effects. CBT and MI were used. Pt will follow up with [facility].








Education:


Pt was educated and counseled about the risks and benefits of taking and not 

taking medications.  





Pt was educated and counseled about the risks of drinking and abusing drugs.   





Pt was educated and counseled to go to the ER or call 911 if pt develop 

suicidal ideation or homicidal ideation, worsening of symptoms or severe side 

effects of the meds.











- Final Diagnosis (DSM 5)


Condition upon Discharge: GOOD


DSM 5: 





Bipolar mixed severe without psychotic features 


Alcohol use disorder severe


Alcohol withdrawal uncomplicated





Disposition: HOME/ ROUTINE


Follow-up Treatment Plan: 








Education:


Pt was educated and counseled about the risks and benefits of taking and not 

taking medications.  





Pt was educated and counseled about the risks of drinking and abusing drugs.   





Pt was educated and counseled to go to the ER or call 911 if pt develop 

suicidal ideation or homicidal ideation, worsening of symptoms or severe side 

effects of the meds.


   





Prescriptions/Medication Reconciliation: 


busPIRone [Buspar] 30 mg PO BID #60 tab


Gabapentin [Neurontin] 400 mg PO TID #90 cap


QUEtiapine [Seroquel] 300 mg PO HS #30 tab





- Smoking Cessation


Smoking Cessation Medication prescribed: No





- Antipsychotic Medications


Pt discharged on 2 or more routine antipsychotic medications: No

## 2018-01-10 ENCOUNTER — HOSPITAL ENCOUNTER (INPATIENT)
Dept: HOSPITAL 31 - C.ER | Age: 53
LOS: 6 days | Discharge: HOME | DRG: 895 | End: 2018-01-16
Attending: PSYCHIATRY & NEUROLOGY | Admitting: PSYCHIATRY & NEUROLOGY
Payer: COMMERCIAL

## 2018-01-10 VITALS — OXYGEN SATURATION: 96 %

## 2018-01-10 VITALS — BODY MASS INDEX: 33.5 KG/M2

## 2018-01-10 DIAGNOSIS — F41.9: ICD-10-CM

## 2018-01-10 DIAGNOSIS — I25.10: ICD-10-CM

## 2018-01-10 DIAGNOSIS — E11.9: ICD-10-CM

## 2018-01-10 DIAGNOSIS — R45.851: ICD-10-CM

## 2018-01-10 DIAGNOSIS — I10: ICD-10-CM
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
## 2018-01-10 DIAGNOSIS — F10.230: Primary | ICD-10-CM

## 2018-01-10 DIAGNOSIS — F31.63: ICD-10-CM

## 2018-01-10 LAB
ALBUMIN SERPL-MCNC: 4.1 G/DL (ref 3.5–5)
ALBUMIN/GLOB SERPL: 1.5 {RATIO} (ref 1–2.1)
ALT SERPL-CCNC: 34 U/L (ref 21–72)
APAP SERPL-MCNC: < 10 UG/ML (ref 10–30)
AST SERPL-CCNC: 27 U/L (ref 17–59)
BASOPHILS # BLD AUTO: 0 K/UL (ref 0–0.2)
BASOPHILS NFR BLD: 0.3 % (ref 0–2)
BILIRUB UR-MCNC: NEGATIVE MG/DL
BUN SERPL-MCNC: 15 MG/DL (ref 9–20)
CALCIUM SERPL-MCNC: 8.2 MG/DL (ref 8.6–10.4)
EOSINOPHIL # BLD AUTO: 0 K/UL (ref 0–0.7)
EOSINOPHIL NFR BLD: 0.4 % (ref 0–4)
ERYTHROCYTE [DISTWIDTH] IN BLOOD BY AUTOMATED COUNT: 13.8 % (ref 11.5–14.5)
GFR NON-AFRICAN AMERICAN: > 60
GLUCOSE UR STRIP-MCNC: NORMAL MG/DL
HGB BLD-MCNC: 13.8 G/DL (ref 12–18)
LEUKOCYTE ESTERASE UR-ACNC: (no result) LEU/UL
LYMPHOCYTES # BLD AUTO: 1.2 K/UL (ref 1–4.3)
LYMPHOCYTES NFR BLD AUTO: 15.9 % (ref 20–40)
MCH RBC QN AUTO: 31.7 PG (ref 27–31)
MCHC RBC AUTO-ENTMCNC: 34.3 G/DL (ref 33–37)
MCV RBC AUTO: 92.4 FL (ref 80–94)
MONOCYTES # BLD: 0.4 K/UL (ref 0–0.8)
MONOCYTES NFR BLD: 4.8 % (ref 0–10)
NEUTROPHILS # BLD: 6 K/UL (ref 1.8–7)
NEUTROPHILS NFR BLD AUTO: 78.6 % (ref 50–75)
NRBC BLD AUTO-RTO: 0 % (ref 0–2)
PH UR STRIP: 6 [PH] (ref 5–8)
PLATELET # BLD: 258 K/UL (ref 130–400)
PMV BLD AUTO: 7.8 FL (ref 7.2–11.7)
PROT UR STRIP-MCNC: NEGATIVE MG/DL
RBC # BLD AUTO: 4.35 MIL/UL (ref 4.4–5.9)
RBC # UR STRIP: NEGATIVE /UL
SALICYLATE: < 1 MG/DL 1
SP GR UR STRIP: 1.01 (ref 1–1.03)
SQUAMOUS EPITHIAL: < 1 /HPF (ref 0–5)
URINE NITRATE: NEGATIVE
UROBILINOGEN UR-MCNC: NORMAL MG/DL (ref 0.2–1)
WBC # BLD AUTO: 7.7 K/UL (ref 4.8–10.8)

## 2018-01-10 PROCEDURE — HZ42ZZZ GROUP COUNSELING FOR SUBSTANCE ABUSE TREATMENT, COGNITIVE-BEHAVIORAL: ICD-10-PCS | Performed by: PSYCHIATRY & NEUROLOGY

## 2018-01-10 PROCEDURE — HZ52ZZZ INDIVIDUAL PSYCHOTHERAPY FOR SUBSTANCE ABUSE TREATMENT, COGNITIVE-BEHAVIORAL: ICD-10-PCS | Performed by: PSYCHIATRY & NEUROLOGY

## 2018-01-10 PROCEDURE — HZ59ZZZ INDIVIDUAL PSYCHOTHERAPY FOR SUBSTANCE ABUSE TREATMENT, SUPPORTIVE: ICD-10-PCS | Performed by: PSYCHIATRY & NEUROLOGY

## 2018-01-10 PROCEDURE — HZ46ZZZ GROUP COUNSELING FOR SUBSTANCE ABUSE TREATMENT, PSYCHOEDUCATION: ICD-10-PCS | Performed by: PSYCHIATRY & NEUROLOGY

## 2018-01-10 PROCEDURE — HZ2ZZZZ DETOXIFICATION SERVICES FOR SUBSTANCE ABUSE TREATMENT: ICD-10-PCS | Performed by: PSYCHIATRY & NEUROLOGY

## 2018-01-10 PROCEDURE — HZ56ZZZ INDIVIDUAL PSYCHOTHERAPY FOR SUBSTANCE ABUSE TREATMENT, PSYCHOEDUCATION: ICD-10-PCS | Performed by: PSYCHIATRY & NEUROLOGY

## 2018-01-10 RX ADMIN — Medication SCH: at 20:21

## 2018-01-10 RX ADMIN — INSULIN GLARGINE SCH UNITS: 100 INJECTION, SOLUTION SUBCUTANEOUS at 21:25

## 2018-01-11 RX ADMIN — Medication SCH TAB: at 10:24

## 2018-01-11 RX ADMIN — INSULIN GLARGINE SCH UNITS: 100 INJECTION, SOLUTION SUBCUTANEOUS at 21:33

## 2018-01-12 RX ADMIN — Medication SCH TAB: at 09:54

## 2018-01-12 RX ADMIN — INSULIN GLARGINE SCH UNITS: 100 INJECTION, SOLUTION SUBCUTANEOUS at 21:31

## 2018-01-13 RX ADMIN — Medication SCH TAB: at 10:03

## 2018-01-13 RX ADMIN — PANTOPRAZOLE SODIUM SCH MG: 40 TABLET, DELAYED RELEASE ORAL at 10:04

## 2018-01-13 RX ADMIN — INSULIN GLARGINE SCH UNITS: 100 INJECTION, SOLUTION SUBCUTANEOUS at 22:07

## 2018-01-14 RX ADMIN — PANTOPRAZOLE SODIUM SCH MG: 40 TABLET, DELAYED RELEASE ORAL at 10:19

## 2018-01-14 RX ADMIN — INSULIN GLARGINE SCH UNITS: 100 INJECTION, SOLUTION SUBCUTANEOUS at 21:40

## 2018-01-14 RX ADMIN — Medication SCH TAB: at 10:17

## 2018-01-15 VITALS — RESPIRATION RATE: 18 BRPM

## 2018-01-15 RX ADMIN — PANTOPRAZOLE SODIUM SCH MG: 40 TABLET, DELAYED RELEASE ORAL at 10:19

## 2018-01-15 RX ADMIN — INSULIN GLARGINE SCH UNITS: 100 INJECTION, SOLUTION SUBCUTANEOUS at 21:20

## 2018-01-15 RX ADMIN — Medication SCH TAB: at 10:19

## 2018-01-16 VITALS — HEART RATE: 68 BPM | SYSTOLIC BLOOD PRESSURE: 125 MMHG | TEMPERATURE: 97.4 F | DIASTOLIC BLOOD PRESSURE: 73 MMHG

## 2018-01-16 RX ADMIN — Medication SCH TAB: at 09:40

## 2018-01-16 RX ADMIN — PANTOPRAZOLE SODIUM SCH MG: 40 TABLET, DELAYED RELEASE ORAL at 09:43

## 2018-02-05 ENCOUNTER — HOSPITAL ENCOUNTER (EMERGENCY)
Dept: HOSPITAL 31 - C.ER | Age: 53
Discharge: LEFT BEFORE DISCHARGE | End: 2018-02-05
Payer: COMMERCIAL

## 2018-02-05 VITALS — BODY MASS INDEX: 33.5 KG/M2

## 2018-02-05 DIAGNOSIS — Z02.89: Primary | ICD-10-CM

## 2018-02-05 DIAGNOSIS — F10.10: ICD-10-CM

## 2018-04-13 ENCOUNTER — HOSPITAL ENCOUNTER (EMERGENCY)
Dept: HOSPITAL 31 - C.ER | Age: 53
LOS: 1 days | Discharge: ANOTHER HOSPITAL | End: 2018-04-14
Payer: COMMERCIAL

## 2018-04-13 VITALS — BODY MASS INDEX: 33.5 KG/M2

## 2018-04-13 DIAGNOSIS — F32.9: Primary | ICD-10-CM

## 2018-04-13 DIAGNOSIS — F10.229: ICD-10-CM

## 2018-04-13 DIAGNOSIS — F14.10: ICD-10-CM
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
## 2018-04-13 LAB
ALBUMIN SERPL-MCNC: 4.2 G/DL (ref 3.5–5)
ALBUMIN/GLOB SERPL: 1.5 {RATIO} (ref 1–2.1)
ALT SERPL-CCNC: 39 U/L (ref 21–72)
AST SERPL-CCNC: 27 U/L (ref 17–59)
BASOPHILS # BLD AUTO: 0 K/UL (ref 0–0.2)
BASOPHILS NFR BLD: 0.3 % (ref 0–2)
BILIRUB UR-MCNC: NEGATIVE MG/DL
BUN SERPL-MCNC: 12 MG/DL (ref 9–20)
CALCIUM SERPL-MCNC: 9.1 MG/DL (ref 8.6–10.4)
EOSINOPHIL # BLD AUTO: 0 K/UL (ref 0–0.7)
EOSINOPHIL NFR BLD: 1 % (ref 0–4)
ERYTHROCYTE [DISTWIDTH] IN BLOOD BY AUTOMATED COUNT: 14.9 % (ref 11.5–14.5)
GFR NON-AFRICAN AMERICAN: > 60
GLUCOSE UR STRIP-MCNC: NORMAL MG/DL
HGB BLD-MCNC: 14.4 G/DL (ref 12–18)
LEUKOCYTE ESTERASE UR-ACNC: (no result) LEU/UL
LYMPHOCYTES # BLD AUTO: 1.7 K/UL (ref 1–4.3)
LYMPHOCYTES NFR BLD AUTO: 40.4 % (ref 20–40)
MCH RBC QN AUTO: 31.5 PG (ref 27–31)
MCHC RBC AUTO-ENTMCNC: 34.5 G/DL (ref 33–37)
MCV RBC AUTO: 91.4 FL (ref 80–94)
MONOCYTES # BLD: 0.5 K/UL (ref 0–0.8)
MONOCYTES NFR BLD: 13.2 % (ref 0–10)
NEUTROPHILS # BLD: 1.9 K/UL (ref 1.8–7)
NEUTROPHILS NFR BLD AUTO: 45.1 % (ref 50–75)
NRBC BLD AUTO-RTO: 0 % (ref 0–2)
PH UR STRIP: 6 [PH] (ref 5–8)
PLATELET # BLD: 300 K/UL (ref 130–400)
PMV BLD AUTO: 6.9 FL (ref 7.2–11.7)
PROT UR STRIP-MCNC: NEGATIVE MG/DL
RBC # BLD AUTO: 4.57 MIL/UL (ref 4.4–5.9)
RBC # UR STRIP: NEGATIVE /UL
SP GR UR STRIP: 1.01 (ref 1–1.03)
UROBILINOGEN UR-MCNC: NORMAL MG/DL (ref 0.2–1)
WBC # BLD AUTO: 4.2 K/UL (ref 4.8–10.8)

## 2018-04-13 PROCEDURE — 82948 REAGENT STRIP/BLOOD GLUCOSE: CPT

## 2018-04-13 PROCEDURE — 99285 EMERGENCY DEPT VISIT HI MDM: CPT

## 2018-04-13 PROCEDURE — 71045 X-RAY EXAM CHEST 1 VIEW: CPT

## 2018-04-13 PROCEDURE — 81001 URINALYSIS AUTO W/SCOPE: CPT

## 2018-04-13 PROCEDURE — 80053 COMPREHEN METABOLIC PANEL: CPT

## 2018-04-13 PROCEDURE — 85025 COMPLETE CBC W/AUTO DIFF WBC: CPT

## 2018-04-14 ENCOUNTER — HOSPITAL ENCOUNTER (INPATIENT)
Dept: HOSPITAL 14 - H.ER | Age: 53
LOS: 4 days | Discharge: HOME | DRG: 895 | End: 2018-04-18
Attending: PSYCHIATRY & NEUROLOGY | Admitting: PSYCHIATRY & NEUROLOGY
Payer: COMMERCIAL

## 2018-04-14 VITALS — BODY MASS INDEX: 33.5 KG/M2

## 2018-04-14 VITALS — OXYGEN SATURATION: 95 %

## 2018-04-14 VITALS
DIASTOLIC BLOOD PRESSURE: 76 MMHG | HEART RATE: 82 BPM | TEMPERATURE: 97.6 F | SYSTOLIC BLOOD PRESSURE: 149 MMHG | OXYGEN SATURATION: 95 %

## 2018-04-14 VITALS — RESPIRATION RATE: 20 BRPM

## 2018-04-14 DIAGNOSIS — E11.9: ICD-10-CM

## 2018-04-14 DIAGNOSIS — F14.90: ICD-10-CM

## 2018-04-14 DIAGNOSIS — Z91.19: ICD-10-CM

## 2018-04-14 DIAGNOSIS — J44.9: ICD-10-CM

## 2018-04-14 DIAGNOSIS — F17.210: ICD-10-CM

## 2018-04-14 DIAGNOSIS — R45.851: ICD-10-CM

## 2018-04-14 DIAGNOSIS — Z91.14: ICD-10-CM
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
## 2018-04-14 DIAGNOSIS — I10: ICD-10-CM

## 2018-04-14 DIAGNOSIS — F10.94: Primary | ICD-10-CM

## 2018-04-14 PROCEDURE — HZ56ZZZ INDIVIDUAL PSYCHOTHERAPY FOR SUBSTANCE ABUSE TREATMENT, PSYCHOEDUCATION: ICD-10-PCS | Performed by: PSYCHIATRY & NEUROLOGY

## 2018-04-14 PROCEDURE — GZHZZZZ GROUP PSYCHOTHERAPY: ICD-10-PCS | Performed by: PSYCHIATRY & NEUROLOGY

## 2018-04-14 RX ADMIN — FLUTICASONE PROPIONATE AND SALMETEROL SCH PUFF: 50; 250 POWDER RESPIRATORY (INHALATION) at 21:06

## 2018-04-14 RX ADMIN — INSULIN LISPRO SCH: 100 INJECTION, SOLUTION INTRAVENOUS; SUBCUTANEOUS at 22:15

## 2018-04-14 NOTE — CP.PCM.CON
History of Present Illness





- History of Present Illness


History of Present Illness: 





53 yo male with history of DM2, HTN and COPD admitted at Psychiatric because of 

worsening depression.





Review of Systems





- Review of Systems


All systems: reviewed and no additional remarkable complaints except (aside 

from those mentioned above, 12 point system review were negative by me)





Past Patient History





- Infectious Disease


Hx of Infectious Diseases: None





- Past Medical History & Family History


Past Medical History?: Yes





- Past Social History


Smoking Status: Heavy Smoker > 10 Cigarettes Daily


Alcohol: > 2 Drinks/Day





- CARDIAC


Hx Cardiac Disorders: Yes


Hx Hypertension: Yes





- PULMONARY


Hx Respiratory Disorders: Yes


Hx Chronic Obstructive Pulmonary Disease (COPD): Yes


Hx Pneumonia: Yes


Hx Sleep Apnea: Yes (uses CPAP at home)





- NEUROLOGICAL


Hx Seizures: Yes (ETOH related)





- HEENT


Hx HEENT Problems: Yes


Other/Comment: HAD SEPTAL SURGERY DUE TO DEVIATION





- RENAL


Hx Chronic Kidney Disease: No





- ENDOCRINE/METABOLIC


Hx Endocrine Disorders: Yes


Hx Diabetes Mellitus Type 2: Yes





- HEMATOLOGICAL/ONCOLOGICAL


Hx Cancer: No


Hx Human Immunodeficiency Virus (HIV): No





- INTEGUMENTARY


Hx Dermatological Problems: No





- MUSCULOSKELETAL/RHEUMATOLOGICAL


Hx Musculoskeletal Disorders: Yes


Hx Back Pain: Yes


Hx Fractures: Yes (right great toe)





- GASTROINTESTINAL


Hx Gastrointestinal Disorders: Yes


Hx Gastritis: Yes





- GENITOURINARY/GYNECOLOGICAL


Hx Genitourinary Disorders: No


Hx Sexually Transmitted Disorders: No





- PSYCHIATRIC


Hx Anxiety: Yes


Hx Bipolar Disorder: Yes


Hx Depression: Yes


Hx Emotional Abuse: No


Hx Physical Abuse: No


Hx Sexual Abuse: No


Hx Substance Use: Yes (cocaine)





- SURGICAL HISTORY


Hx Surgeries: Yes


Hx Tonsillectomy: Yes (as a child)





- ANESTHESIA


Hx Anesthesia: Yes


Hx Anesthesia Reactions: No


Hx Malignant Hyperthermia: No


Has any member of the family had a problem w/ anesthesia?: No





Meds


Allergies/Adverse Reactions: 


 Allergies











Allergy/AdvReac Type Severity Reaction Status Date / Time


 


No Known Allergies Allergy   Verified 04/14/18 13:10














- Medications


Medications: 


 Current Medications





Acetaminophen (Tylenol 325mg Tab)  650 mg PO Q4 PRN


   PRN Reason: Pain, Mild (1-3)


Al Hydrox/Mg Hydrox/Simethicone (Maalox Plus 30 Ml)  30 ml PO Q4 PRN


   PRN Reason: Dyspepsia


Atenolol (Tenormin)  50 mg PO BID NEYDA


Atorvastatin Calcium (Lipitor)  10 mg PO HS NEYDA


Chlordiazepoxide (Librium)  10 mg PO Q6 [facility]


   Last Admin: 04/14/18 15:05 Dose:  10 mg


Diphenhydramine HCl (Benadryl)  50 mg IM Q6 PRN


   PRN Reason: Extrapyramidal S/S Unable PO


Diphenhydramine HCl (Benadryl)  50 mg PO Q6 PRN


   PRN Reason: Extrapyramidal Symptoms


Folic Acid (Folic Acid)  1 mg PO DAILY [facility]


Gabapentin (Neurontin)  400 mg PO TID [facility]


Glipizide (Glucotrol)  10 mg PO DAILY [facility]


Haloperidol (Haldol)  5 mg PO Q4 PRN


   PRN Reason: Agitation


Haloperidol Lactate (Haldol)  5 mg IM Q4 PRN


   PRN Reason: Agitation, Unable to Take PO


Loratadine (Claritin)  10 mg PO DAILY [facility]


Lorazepam (Ativan)  2 mg IM Q4 PRN


   PRN Reason: Anxiety/Agitation,Unable PO


Lorazepam (Ativan)  2 mg PO Q4 PRN


   PRN Reason: Anxiety/Agitation


Magnesium Hydroxide (Milk Of Magnesia)  30 ml PO HS PRN


   PRN Reason: Constipation


Metformin HCl (Glucophage)  1,000 mg PO BID [facility]


Multivitamins/Minerals (Therapeutic-M Tab)  1 tab PO DAILY [facility]


Quetiapine Fumarate (Seroquel)  100 mg PO HS [facility]


Sertraline HCl (Zoloft)  25 mg PO DAILY [facility]


Thiamine HCl (Vitamin B1 Tab)  100 mg PO DAILY [facility]











Physical Exam





- Constitutional


Appears: No Acute Distress





- Head Exam


Head Exam: ATRAUMATIC





- Eye Exam


Eye Exam: absent: Scleral icterus





- ENT Exam


ENT Exam: Mucous Membranes Moist





- Neck Exam


Neck exam: Negative for: Meningismus





- Respiratory Exam


Respiratory Exam: absent: Rales, Rhonchi, Wheezes, Respiratory Distress





- Cardiovascular Exam


Cardiovascular Exam: REGULAR RHYTHM, +S1, +S2





- GI/Abdominal Exam


GI & Abdominal Exam: Soft.  absent: Tenderness





- Rectal Exam


Rectal Exam: Deferred





- Extremities Exam


Extremities exam: Negative for: calf tenderness, pedal edema





- Back Exam


Back exam: absent: tenderness





- Neurological Exam


Neurological exam: Alert, Oriented x3





- Psychiatric Exam


Psychiatric exam: Normal Affect





- Skin


Skin Exam: Dry, Intact





Results





- Vital Signs


Recent Vital Signs: 


 Last Vital Signs











Temp  98.0 F   04/14/18 13:10


 


Pulse  81   04/14/18 14:17


 


Resp  20   04/14/18 14:17


 


BP  124/85   04/14/18 13:10


 


Pulse Ox  95   04/14/18 13:10














- Labs


Labs: 


 Laboratory Results - last 24 hr











  04/14/18





  15:28


 


POC Glucose (mg/dL)  170 H














Assessment & Plan


(1) Depression


Status: Acute   


Comment: psyche is managing   





(2) Alcohol abuse


Status: Acute   


Comment: psyche is managing   





(3) DM2 (diabetes mellitus, type 2)


Status: Chronic   


Comment: BS uncontrolled since patient unable to take medications today.  

continue Glipizide and Metformin.  accuchek ACHS.  HgA1C, BMP in am   





(4) HTN (hypertension)


Status: Chronic   


Comment: BP stable.  continue Atenolol 50mg PO BID   





(5) COPD (chronic obstructive pulmonary disease)


Status: Chronic   


Comment: on Advair 1 puff q 12hrs

## 2018-04-14 NOTE — PCM.BM
<Gela Helm - Last Filed: 04/14/18 14:40>





Treatment Plan Problems





- Problems identified on initial assessmt


  ** Hopelessness/Helplessness


Date Initiated: 04/14/18


Time Initiated: 14:45


Assessment reference: HP, NA


Status: Active





Treatment assets and liabiliti


Patient Assests: good support system, financial stabiity, cooperative, self-

reliant, ADL independent


Patient Liabilities: substance abuse, medical problems





- Milieu Protocol


Maintain good personal hygiene: daily Encourage regular showers, daily Remind 

patient to perform daily oral care, daily Assist patient to perform ADL's


Conduct patient checks and document Observation sheet: Q15 minutes


Maintain personal safety: every shift Educate patient to report safety concerns 

to staff, every shift Monitor environment for contraband/sharps


Medication safety: Monitor for expected outcome, potential side effects: every 

shift, Assess barriers to learning: every shift, Assess readiness for 

medication education: every shift





<John Chan - Last Filed: 04/15/18 17:02>





Family Contact


Family involvement: Family/SO is involved


Family contact: Patient agrees to contact, Family has been contacted by patient


Family contact name: Reina (Wife) [phone]


Family contacted how many times per week?: 3





- Goals for Treatment


Patient goals for treatment: Pt reported he would like to be linked to 

outpatient services to help him better control his anxiety. Pt identified his 

anxiety as the reason he drinks.





Discharge/Continuing Care





- Education Needs


Education Needs: Family Medication, Family Diagnosis/Disease Process, Family 

Coping Skills, Family Anger Management skills, Family Community resources, 

Family Aftercare Safety Plan, Patient Medication, Patient Diagnosis/Disease 

Process, Patient Coping Skills, Patient Anger Management skills, Patient 

Community resources, Patient Aftercare Safety Plan





- Discharge


Discharge Criteria: Tolerates medication w/o severe side effects, Free of 

agitation, Normal sleep pattern, Ability to care for self, No longer exhibiting 

s/s of withdrawal, Reduction of target symptoms


Discharge to:: Home, With Family





- Treatment Team Participation


Was Patient/Family/SO present at Treatment Team Meeting: Yes





<Hiral Jason - Last Filed: 04/16/18 10:36>





- Diagnosis


(1) Alcohol-induced mood disorder


Status: Acute   


Interventions: 


Medication management, Individual and group therapy, Psychoeducation


04/16/18 10:36











(2) Alcohol use disorder


Status: Acute   


Interventions: 


Medication management, Individual and group therapy, Psychoeducation


04/16/18 10:37











(3) Cocaine abuse


Status: Acute   


Interventions: 


Medication management, Individual and group therapy, Psychoeducation


04/16/18 10:37











<Yanira Gil M - Last Filed: 04/16/18 16:00>





Discharge/Continuing Care





- Education Needs


Education Needs: Family Medication, Family Diagnosis/Disease Process, Family 

Coping Skills, Family Anger Management skills, Family Community resources, 

Family Aftercare Safety Plan, Patient Medication, Patient Diagnosis/Disease 

Process, Patient Coping Skills, Patient Anger Management skills, Patient 

Community resources, Patient Aftercare Safety Plan





- Discharge


Discharge Criteria: Tolerates medication w/o severe side effects, Free of 

Suicidal thoughts, Normal sleep pattern, Ability to care for self, Reduction of 

target symptoms


Discharge to:: Home, With Family





- Additional Comments





04/16/18 15:59


Pt seen in team meeting. Reason for hospitalization reviewed and discussed. Pt 

stated "I've been going through depression and anxiety." Pt denied active SI 

and HI at time of assessment. Pt's social and medical issues reviewed and 

discussed. Pt's medications reviewed. Tx plan reviewed and pt is agreeable. SW 

to continue to follow case.  





- Treatment Team Participation


Discussed with Family/SO: No


Was Patient/Family/SO present at Treatment Team Meeting: Yes

## 2018-04-15 LAB
AMYLASE SERPL-CCNC: 75 U/L (ref 30–110)
BUN SERPL-MCNC: 18 MG/DL (ref 9–20)
CALCIUM SERPL-MCNC: 9.9 MG/DL (ref 8.4–10.2)
ERYTHROCYTE [DISTWIDTH] IN BLOOD BY AUTOMATED COUNT: 15.5 % (ref 11.5–14.5)
GFR NON-AFRICAN AMERICAN: > 60
HDLC SERPL-MCNC: 40 MG/DL (ref 30–70)
HGB BLD-MCNC: 15.1 G/DL (ref 12–18)
LDLC SERPL-MCNC: 113 MG/DL (ref 0–129)
LIPASE SERPL-CCNC: 102 U/L (ref 23–300)
MCH RBC QN AUTO: 31.7 PG (ref 27–31)
MCHC RBC AUTO-ENTMCNC: 34.2 G/DL (ref 33–37)
MCV RBC AUTO: 92.8 FL (ref 80–94)
PLATELET # BLD: 257 K/UL (ref 130–400)
RBC # BLD AUTO: 4.77 MIL/UL (ref 4.4–5.9)
WBC # BLD AUTO: 3.6 K/UL (ref 4.8–10.8)

## 2018-04-15 RX ADMIN — INSULIN LISPRO SCH: 100 INJECTION, SOLUTION INTRAVENOUS; SUBCUTANEOUS at 21:07

## 2018-04-15 RX ADMIN — FLUTICASONE PROPIONATE AND SALMETEROL SCH PUFF: 50; 250 POWDER RESPIRATORY (INHALATION) at 21:06

## 2018-04-15 RX ADMIN — MULTIPLE VITAMINS W/ MINERALS TAB SCH TAB: TAB at 08:13

## 2018-04-15 RX ADMIN — FLUTICASONE PROPIONATE AND SALMETEROL SCH PUFF: 50; 250 POWDER RESPIRATORY (INHALATION) at 08:14

## 2018-04-15 RX ADMIN — INSULIN LISPRO SCH: 100 INJECTION, SOLUTION INTRAVENOUS; SUBCUTANEOUS at 09:10

## 2018-04-15 RX ADMIN — INSULIN LISPRO SCH: 100 INJECTION, SOLUTION INTRAVENOUS; SUBCUTANEOUS at 17:21

## 2018-04-15 RX ADMIN — INSULIN LISPRO SCH: 100 INJECTION, SOLUTION INTRAVENOUS; SUBCUTANEOUS at 10:58

## 2018-04-15 NOTE — PCM.PSYCH
Initial Psychiatric Evaluation





- Initial Psychiatric Evaluation


Type of Admission: Voluntary


Legal Status: Capacity


Chief Complaint (in patient's own words): 





I am anxious and depressed


Patient's Reaction to Hospitalization: 





pt requested help


History of Present Illness and Precipitating Events: 





pt is 52ys old male previous psychiatric diagnosis of alcohol use disorder and 

bipolar disorder, pt non compliant with medications or follow up relating that 

to insurance problems, pt has been feeling increasingly depressed and anxious 

relapsed on alcohol using half a pint of vodka daily, on the day pt presented 

to ER he started experiencing suicidal ideations with  plan to overdose on pills

, he came to ER seeking help


on evaluation pt reported passive suicidal ideations without plan on the unit, 

reported low energy anhedonia and increased anxiety


denied manic or psychotic symptoms, BAL was 300, utox positive for cocaine


Current Medications: 





Active Medications











Generic Name Dose Route Start Last Admin





  Trade Name Freq  PRN Reason Stop Dose Admin


 


Acetaminophen  650 mg  04/14/18 14:15  04/15/18 10:01





  Tylenol 325mg Tab  PO   650 mg





  Q4 PRN   Administration





  Pain, Mild (1-3)   


 


Al Hydrox/Mg Hydrox/Simethicone  30 ml  04/14/18 14:15  





  Maalox Plus 30 Ml  PO   





  Q4 PRN   





  Dyspepsia   


 


Atenolol  50 mg  04/14/18 17:15  04/15/18 08:15





  Tenormin  PO   50 mg





  BID NEYDA   Administration


 


Atorvastatin Calcium  10 mg  04/14/18 22:00  04/14/18 21:07





  Lipitor  PO   10 mg





  HS NEYDA   Administration


 


Chlordiazepoxide  10 mg  04/15/18 17:00  





  Librium  PO   





  Q8 NEYDA   


 


Diphenhydramine HCl  50 mg  04/14/18 14:15  





  Benadryl  IM   





  Q6 PRN   





  Extrapyramidal S/S Unable PO   


 


Diphenhydramine HCl  50 mg  04/14/18 14:15  





  Benadryl  PO   





  Q6 PRN   





  Extrapyramidal Symptoms   


 


Folic Acid  1 mg  04/15/18 09:00  04/15/18 08:16





  Folic Acid  PO   1 mg





  DAILY NEYDA   Administration


 


Gabapentin  400 mg  04/15/18 09:00  04/15/18 08:14





  Neurontin  PO   400 mg





  TID NEYDA   Administration


 


Glipizide  10 mg  04/15/18 09:00  04/15/18 08:14





  Glucotrol  PO   10 mg





  DAILY NEYDA   Administration


 


Haloperidol  5 mg  04/14/18 14:13  





  Haldol  PO   





  Q4 PRN   





  Agitation   


 


Haloperidol Lactate  5 mg  04/14/18 14:15  





  Haldol  IM   





  Q4 PRN   





  Agitation, Unable to Take PO   


 


Insulin Human Lispro  0 units  04/14/18 22:00  04/15/18 10:58





  Humalog  SC   Not Given





  [facility]   





  Protocol   


 


Loratadine  10 mg  04/15/18 09:00  04/15/18 08:15





  Claritin  PO   10 mg





  DAILY NEYDA   Administration


 


Lorazepam  2 mg  04/14/18 14:15  





  Ativan  IM   





  Q4 PRN   





  Anxiety/Agitation,Unable PO   


 


Lorazepam  2 mg  04/14/18 14:15  04/14/18 21:57





  Ativan  PO   2 mg





  Q4 PRN   Administration





  Anxiety/Agitation   


 


Magnesium Hydroxide  30 ml  04/14/18 14:15  





  Milk Of Magnesia  PO   





  HS PRN   





  Constipation   


 


Metformin HCl  1,000 mg  04/14/18 17:15  04/15/18 08:13





  Glucophage  PO   1,000 mg





  BID NEYDA   Administration


 


Multivitamins/Minerals  1 tab  04/15/18 09:00  04/15/18 08:13





  Therapeutic-M Tab  PO   1 tab





  DAILY NEYDA   Administration


 


Quetiapine Fumarate  300 mg  04/15/18 22:00  





  Seroquel  PO   





  HS NEYDA   


 


Fluticasone/Salmeterol  1 puff  04/14/18 21:00  04/15/18 08:14





  Advair Diskus 250/50  IH   1 puff





  Q12 NEYDA   Administration


 


Sertraline HCl  25 mg  04/15/18 09:00  04/15/18 08:15





  Zoloft  PO   25 mg





  DAILY NEYDA   Administration


 


Thiamine HCl  100 mg  04/15/18 09:00  04/15/18 08:16





  Vitamin B1 Tab  PO   100 mg





  DAILY NEYDA   Administration














Past Psychiatric History





- Past Psychiatric History


Explanation of prior treatment: 





multiple inpatient hospitalizations, history of non compliance with treatment


History of ETOH/Drug Use: 





history of alcohol and cocaine use


Pertinent Medical Hx (Current Medical&Sleep Prob, Allergies): 





 Allergies











Allergy/AdvReac Type Severity Reaction Status Date / Time


 


No Known Allergies Allergy   Verified 04/14/18 13:10








 





MetFORMIN [glucoPHAGE] 1,000 mg PO BID 05/17/15 


Atenolol 50 mg PO BID 11/06/17 


GlipiZIDE [Glucotrol] 10 mg PO DAILY 11/06/17 


Simvastatin 10 mg PO HS 12/08/17 


QUEtiapine [Seroquel] 300 mg PO HS #30 tab 12/11/17 


Gabapentin [Neurontin] 400 mg PO TID 04/14/18 











Mental Status Examination





- Personal Presentation


Personal Presentation: Looks older than stated age





- Affect


Affect: Constricted, Depressed





- Motor Activity


Motor Activity: Psychomotor Retardation





- Reliability in Providing Information


Reliability in Providing Information: Poor, due to altered mood





- Speech


Speech: Relevant





- Mood


Mood: Depressed, Anxious





- Formal Thought Process


Formal Thought Process: Circumstantial





- Hallucinations/Delusions


Additional comments: 





pt denied perceptual disturbances, non elicited





- Obsessions/Compulsions


Obsessions: No


Compulsions: No





- Cognitive Functions


Orientation: Person, Place, Situation


Sensorium: Alert


Attention/Concentration: Attentive


Abstract Thinking: [place]co


Judgement: Imparied, as evidence by: Poor judgement, Imparied, as evidence by: 

Lack of insight into illness





- Risk


Risk: Withdrawal, Diminished functioning





- Strength & Assets Inventory


Strength & Assets Inventory: Life experience





- Limitations


Additional comments: 





poor compliance





DSM 5 DX





- DSM 5


DSM 5 Diagnosis: 


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


alcohol use disorder


cocaine abuse


bipolar disorder depressed





- Recommended/Plan of Treatment


Treatment Recommendations and Plan of Treatment: 





librium ptotocol/ downtitrate gradually and monitor pt for symptoms and signs 

of withdrawal


neurontin 400mg tid


seroquel 300mg qhs


motivational, group and supportive therapy

## 2018-04-16 RX ADMIN — INSULIN LISPRO SCH UNIT: 100 INJECTION, SOLUTION INTRAVENOUS; SUBCUTANEOUS at 16:47

## 2018-04-16 RX ADMIN — FLUTICASONE PROPIONATE AND SALMETEROL SCH PUFF: 50; 250 POWDER RESPIRATORY (INHALATION) at 08:25

## 2018-04-16 RX ADMIN — FLUTICASONE PROPIONATE AND SALMETEROL SCH PUFF: 50; 250 POWDER RESPIRATORY (INHALATION) at 21:12

## 2018-04-16 RX ADMIN — INSULIN LISPRO SCH UNIT: 100 INJECTION, SOLUTION INTRAVENOUS; SUBCUTANEOUS at 08:28

## 2018-04-16 RX ADMIN — INSULIN LISPRO SCH: 100 INJECTION, SOLUTION INTRAVENOUS; SUBCUTANEOUS at 13:56

## 2018-04-16 RX ADMIN — INSULIN LISPRO SCH: 100 INJECTION, SOLUTION INTRAVENOUS; SUBCUTANEOUS at 21:13

## 2018-04-16 RX ADMIN — MULTIPLE VITAMINS W/ MINERALS TAB SCH TAB: TAB at 08:30

## 2018-04-16 NOTE — PCM.PYCHPN
Psychiatric Progress Note





- Psychiatric Progress Note


Patient seen today, length of contact: Patient evaluated, case discussed w/ team

, chart reviewed


Patient Chief Complaint: 


"I'm depressed."


Problems Identified/Issues Discussed: 


Patient continues to report that he feels depressed.  No current AH/VH/SI/HI/

paranoia/delusions.   He reports that he does not want to take the Zoloft 

because he does not feel that it was helpful in the past.   No adverse effects 

to medications reported.  He is currently minimizing his alcohol and cocaine 

abuse.  Psychoeducation provided on the dangers of alcohol/cocaine abuse and 

the importance of compliance w/ treatment and medications.   No current signs/

symptoms of ETOH withdrawal.  Will continue to taper Librium.


Medication Change: Yes (Taper Librium)


Medical Record Reviewed: Yes


Consults ordered or reviewed: 


Medicine consult





Mental Status Examination





- Cognitive Function


Orientation: Person, Place, Situation, Time


Memory: Intact


Attention: WNL


Concentration: WNL


Association: WNL


Fund of Knowledge: WNL


Decription of patient's judgement and insights: 


Poor I/J





- Mood


Mood: Depressed, Anxious





- Affect


Affect: Constricted, Depressed





- Speech


Speech: Appropriate





- Formal Thought Process


Formal Thought Process: No Impairment


Psychotic Thoughts and Behaviors: 


No AH/VH/paranoia/delusions





- Suicidal Ideation


Suicidal Ideation: No





- Homicidal Ideation


Homicidal Ideation: No





Goal/Treatment Plan





- Goal/Treatment Plan


Need for Continued Stay: Remain at risks for inpatient hospitalization, Severe 

depression anxiety, Discharge may exacerbated symptoms


Progress Toward Problem(s) and Goals/Treatment Plan: 


Alcohol Induced Mood Disorder; Alcohol Use Disorder; Cocaine Use Disorder





-Stop Zoloft


-Continue Seroquel


-Taper Librium


-Medicine consult


-Individual and group therapy


-Disposition planning 


Estimated Date of D/C: 04/19/18

## 2018-04-16 NOTE — CARD
--------------- APPROVED REPORT --------------





EKG Measurement

Heart [id number]

[id number]

[id number]

[id number]

[id number]



<Conclusion>

Normal sinus rhythm

Normal ECG

## 2018-04-17 RX ADMIN — INSULIN LISPRO SCH UNIT: 100 INJECTION, SOLUTION INTRAVENOUS; SUBCUTANEOUS at 08:39

## 2018-04-17 RX ADMIN — INSULIN LISPRO SCH UNIT: 100 INJECTION, SOLUTION INTRAVENOUS; SUBCUTANEOUS at 17:07

## 2018-04-17 RX ADMIN — FLUTICASONE PROPIONATE AND SALMETEROL SCH PUFF: 50; 250 POWDER RESPIRATORY (INHALATION) at 08:39

## 2018-04-17 RX ADMIN — INSULIN LISPRO SCH: 100 INJECTION, SOLUTION INTRAVENOUS; SUBCUTANEOUS at 21:11

## 2018-04-17 RX ADMIN — FLUTICASONE PROPIONATE AND SALMETEROL SCH PUFF: 50; 250 POWDER RESPIRATORY (INHALATION) at 21:09

## 2018-04-17 RX ADMIN — INSULIN LISPRO SCH UNIT: 100 INJECTION, SOLUTION INTRAVENOUS; SUBCUTANEOUS at 13:57

## 2018-04-17 RX ADMIN — MULTIPLE VITAMINS W/ MINERALS TAB SCH TAB: TAB at 08:41

## 2018-04-17 NOTE — PCM.PYCHPN
Psychiatric Progress Note





- Psychiatric Progress Note


Patient seen today, length of contact: Patient evaluated, case discussed w/ team

, chart reviewed


Patient Chief Complaint: 


"I'm depressed."


Problems Identified/Issues Discussed: 


Patient continues to report that he feels depressed.  No current AH/VH/SI/HI/

paranoia/delusions. No adverse effects to medications reported.    

Psychoeducation provided on the dangers of alcohol/cocaine abuse and the 

importance of compliance w/ treatment and medications.   No current signs/

symptoms of ETOH withdrawal.  Will continue to taper Librium.


Medication Change: Yes (Taper Librium)


Medical Record Reviewed: Yes


Consults ordered or reviewed: 


Medicine consult





Mental Status Examination





- Cognitive Function


Orientation: Person, Place, Situation, Time


Memory: Intact


Attention: WNL


Concentration: WNL


Association: WNL


Fund of Knowledge: WNL


Decription of patient's judgement and insights: 


Poor I/J





- Mood


Mood: Depressed





- Affect


Affect: Constricted





- Speech


Speech: Appropriate





- Formal Thought Process


Formal Thought Process: No Impairment


Psychotic Thoughts and Behaviors: 


No AH/VH/paranoia/delusions





- Suicidal Ideation


Suicidal Ideation: No





- Homicidal Ideation


Homicidal Ideation: No





Goal/Treatment Plan





- Goal/Treatment Plan


Need for Continued Stay: Remain at risks for inpatient hospitalization, Severe 

depression anxiety, Discharge may exacerbated symptoms


Progress Toward Problem(s) and Goals/Treatment Plan: 


Alcohol Induced Mood Disorder; Alcohol Use Disorder; Cocaine Use Disorder





-Continue Seroquel


-Taper Librium


-Medicine consult


-Individual and group therapy


-Disposition planning 


Estimated Date of D/C: 04/19/18

## 2018-04-18 VITALS
HEART RATE: 70 BPM | RESPIRATION RATE: 20 BRPM | DIASTOLIC BLOOD PRESSURE: 71 MMHG | SYSTOLIC BLOOD PRESSURE: 124 MMHG | TEMPERATURE: 97.5 F

## 2018-04-18 RX ADMIN — FLUTICASONE PROPIONATE AND SALMETEROL SCH PUFF: 50; 250 POWDER RESPIRATORY (INHALATION) at 12:21

## 2018-04-18 RX ADMIN — MULTIPLE VITAMINS W/ MINERALS TAB SCH TAB: TAB at 08:33

## 2018-04-18 RX ADMIN — INSULIN LISPRO SCH UNIT: 100 INJECTION, SOLUTION INTRAVENOUS; SUBCUTANEOUS at 08:34

## 2018-04-18 RX ADMIN — INSULIN LISPRO SCH UNIT: 100 INJECTION, SOLUTION INTRAVENOUS; SUBCUTANEOUS at 12:25

## 2018-04-18 NOTE — PCM.PYCHDC
Mental Status Examination





- Mental Status Examination


Orientation: Person, Place, Situation, Time


Memory: Intact


Mood: Neutral


Affect: Broad


Speech: Appropriate


Attention: WNL


Concentration: WNL


Association: WNL


Fund of Knowledge: WNL


Formal Thought Process: No Impairment


Description of patient's judgement and insight: 


Improved I/J; chronic poor J w/ alcohol abuse; psychoeducation provided


Psychotic Thoughts and Behaviors: 


No AH/VH/paranoia/delusions


Suicidal Ideation: No


Current Homicidal Ideation?: No





Discharge Summary





- Discharge Note


Reason for Hospitalization: 


As per initial HPI note: pt is 52ys old male previous psychiatric diagnosis of 

alcohol use disorder and bipolar disorder, pt non compliant with medications or 

follow up relating that to insurance problems, pt has been feeling increasingly 

depressed and anxious relapsed on alcohol using half a pint of vodka daily, on 

the day pt presented to ER he started experiencing suicidal ideations with  

plan to overdose on pills, he came to ER seeking help


on evaluation pt reported passive suicidal ideations without plan on the unit, 

reported low energy anhedonia and increased anxiety


denied manic or psychotic symptoms, BAL was 300, utox positive for cocaine 


Laboratory Data: 





 Abnormal Lab Results











  04/17/18 04/17/18 04/17/18





  12:17 15:31 20:57


 


POC Glucose (mg/dL)  199 H  164 H  169 H














  04/18/18





  05:54


 


POC Glucose (mg/dL)  164 H











Consultations:: List each consultation separately and include:  1. Reason for 

request.  2. Findings.  3. Follow-up


Consultations: 


Medicine consult


Summary of Hospital Course include:: 1. Description of specific treatment plan 

utilized for patients during their course of treatmen.  2. Summarize the time-

course for resolution of acute symptoms and/or regressed behaviors.  3. 

Describe issues identified and worked on during hospitalization.  4. Describe 

medication utilized.  5. Describe medical problems identified and treated.  6. 

Reassessment of suicide risk


Summary of Hospital Course: 


Patient was admitted to the psychiatry unit.  Individual and group therapy were 

provided.  Patient was restabilized on Seroquel 300 mg PO HS.  He denies 

current depression/anxiety/SI/HI.  Psychoeducation provided on the dangers of 

alcohol abuse.  Patient informed to call 911 or go to the nearest ED if he has 

suicidal ideation in the future.  Disposition explained to patient's wife, see 

SW note.  No current signs/symptoms of ETOH withdrawal. 





- Diagnosis


(1) Alcohol-induced mood disorder


Current Visit: Yes   Status: Chronic   





(2) Alcohol use disorder


Current Visit: No   Status: Chronic   





(3) Cocaine abuse


Current Visit: No   Status: Chronic   





- Final Diagnosis (DSM 5)


Condition upon Discharge: STABLE


DSM 5: 


Alcohol Induced Mood Disorder; Alcohol Use Disorder; Cocaine Use Disorder


Disposition: HOME/ ROUTINE


Follow-up Treatment Plan: 


Alcohol Induced Mood Disorder; Alcohol Use Disorder; Cocaine Use Disorder





-Continue Seroquel


-Librium stopped; no current signs/symptoms of ETOH withdrawal 


-Patient not agreeable to inpatient alcohol rehab; patient referred to 

outpatient treatment


Prescriptions/Medication Reconciliation: 


Gabapentin [Neurontin] 400 mg PO TID #90 cap


Loratadine [Claritin] 10 mg PO DAILY #30 tab


QUEtiapine [SEROquel] 300 mg PO HS #30 tab





- Smoking Cessation


Smoking Cessation Medication prescribed: No


Reason for not providing: Patient declined





- Antipsychotic Medications


Pt discharged on 2 or more routine antipsychotic medications: No

## 2018-07-27 ENCOUNTER — HOSPITAL ENCOUNTER (INPATIENT)
Dept: HOSPITAL 31 - C.ER | Age: 53
LOS: 7 days | Discharge: HOME | DRG: 885 | End: 2018-08-03
Attending: PSYCHIATRY & NEUROLOGY | Admitting: PSYCHIATRY & NEUROLOGY
Payer: COMMERCIAL

## 2018-07-27 VITALS — BODY MASS INDEX: 33.5 KG/M2

## 2018-07-27 DIAGNOSIS — G47.00: ICD-10-CM

## 2018-07-27 DIAGNOSIS — F10.220: ICD-10-CM

## 2018-07-27 DIAGNOSIS — F41.0: ICD-10-CM

## 2018-07-27 DIAGNOSIS — Z79.4: ICD-10-CM

## 2018-07-27 DIAGNOSIS — I10: ICD-10-CM

## 2018-07-27 DIAGNOSIS — J44.9: ICD-10-CM

## 2018-07-27 DIAGNOSIS — G47.30: ICD-10-CM

## 2018-07-27 DIAGNOSIS — F14.10: ICD-10-CM
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
## 2018-07-27 DIAGNOSIS — F17.200: ICD-10-CM

## 2018-07-27 DIAGNOSIS — F03.90: ICD-10-CM

## 2018-07-27 DIAGNOSIS — F10.230: ICD-10-CM

## 2018-07-27 DIAGNOSIS — E11.9: ICD-10-CM

## 2018-07-27 DIAGNOSIS — F31.9: Primary | ICD-10-CM

## 2018-07-27 LAB
ALBUMIN SERPL-MCNC: 4.7 G/DL (ref 3.5–5)
ALBUMIN/GLOB SERPL: 2 {RATIO} (ref 1–2.1)
ALT SERPL-CCNC: 38 U/L (ref 21–72)
APAP SERPL-MCNC: < 10 UG/ML (ref 10–30)
AST SERPL-CCNC: 17 U/L (ref 17–59)
BASOPHILS # BLD AUTO: 0 K/UL (ref 0–0.2)
BASOPHILS NFR BLD: 0.2 % (ref 0–2)
BILIRUB UR-MCNC: NEGATIVE MG/DL
BUN SERPL-MCNC: 15 MG/DL (ref 9–20)
CALCIUM SERPL-MCNC: 8.9 MG/DL (ref 8.6–10.4)
EOSINOPHIL # BLD AUTO: 0 K/UL (ref 0–0.7)
EOSINOPHIL NFR BLD: 0.5 % (ref 0–4)
ERYTHROCYTE [DISTWIDTH] IN BLOOD BY AUTOMATED COUNT: 13.9 % (ref 11.5–14.5)
GFR NON-AFRICAN AMERICAN: > 60
GLUCOSE UR STRIP-MCNC: NORMAL MG/DL
HGB BLD-MCNC: 15.1 G/DL (ref 12–18)
LEUKOCYTE ESTERASE UR-ACNC: (no result) LEU/UL
LYMPHOCYTES # BLD AUTO: 2.2 K/UL (ref 1–4.3)
LYMPHOCYTES NFR BLD AUTO: 21.5 % (ref 20–40)
MCH RBC QN AUTO: 30.6 PG (ref 27–31)
MCHC RBC AUTO-ENTMCNC: 34.6 G/DL (ref 33–37)
MCV RBC AUTO: 88.3 FL (ref 80–94)
MONOCYTES # BLD: 0.8 K/UL (ref 0–0.8)
MONOCYTES NFR BLD: 8 % (ref 0–10)
NEUTROPHILS # BLD: 7 K/UL (ref 1.8–7)
NEUTROPHILS NFR BLD AUTO: 69.8 % (ref 50–75)
NRBC BLD AUTO-RTO: 0.2 % (ref 0–2)
PH UR STRIP: 6 [PH] (ref 5–8)
PLATELET # BLD: 330 K/UL (ref 130–400)
PMV BLD AUTO: 7.4 FL (ref 7.2–11.7)
PROT UR STRIP-MCNC: NEGATIVE MG/DL
RBC # BLD AUTO: 4.94 MIL/UL (ref 4.4–5.9)
RBC # UR STRIP: NEGATIVE /UL
SALICYLATE: < 1 MG/DL 1
SP GR UR STRIP: 1 (ref 1–1.03)
UROBILINOGEN UR-MCNC: NORMAL MG/DL (ref 0.2–1)
WBC # BLD AUTO: 10.1 K/UL (ref 4.8–10.8)

## 2018-07-28 VITALS — OXYGEN SATURATION: 95 %

## 2018-07-28 PROCEDURE — GZ56ZZZ INDIVIDUAL PSYCHOTHERAPY, SUPPORTIVE: ICD-10-PCS | Performed by: PSYCHIATRY & NEUROLOGY

## 2018-07-28 PROCEDURE — GZHZZZZ GROUP PSYCHOTHERAPY: ICD-10-PCS | Performed by: PSYCHIATRY & NEUROLOGY

## 2018-07-28 RX ADMIN — Medication SCH TAB: at 09:40

## 2018-07-29 RX ADMIN — PANTOPRAZOLE SODIUM SCH MG: 40 TABLET, DELAYED RELEASE ORAL at 16:43

## 2018-07-29 RX ADMIN — Medication SCH TAB: at 09:35

## 2018-07-29 RX ADMIN — INSULIN GLARGINE SCH UNITS: 100 INJECTION, SOLUTION SUBCUTANEOUS at 21:08

## 2018-07-30 RX ADMIN — INSULIN GLARGINE SCH UNITS: 100 INJECTION, SOLUTION SUBCUTANEOUS at 21:36

## 2018-07-30 RX ADMIN — PANTOPRAZOLE SODIUM SCH MG: 40 TABLET, DELAYED RELEASE ORAL at 09:41

## 2018-07-30 RX ADMIN — Medication SCH TAB: at 09:41

## 2018-07-31 VITALS — RESPIRATION RATE: 18 BRPM

## 2018-07-31 RX ADMIN — Medication SCH TAB: at 09:54

## 2018-07-31 RX ADMIN — INSULIN GLARGINE SCH UNITS: 100 INJECTION, SOLUTION SUBCUTANEOUS at 21:26

## 2018-07-31 RX ADMIN — PANTOPRAZOLE SODIUM SCH MG: 40 TABLET, DELAYED RELEASE ORAL at 09:54

## 2018-08-01 RX ADMIN — INSULIN GLARGINE SCH UNITS: 100 INJECTION, SOLUTION SUBCUTANEOUS at 22:07

## 2018-08-01 RX ADMIN — PANTOPRAZOLE SODIUM SCH MG: 40 TABLET, DELAYED RELEASE ORAL at 10:10

## 2018-08-01 RX ADMIN — Medication SCH TAB: at 10:09

## 2018-08-02 RX ADMIN — INSULIN GLARGINE SCH UNITS: 100 INJECTION, SOLUTION SUBCUTANEOUS at 21:40

## 2018-08-02 RX ADMIN — Medication SCH TAB: at 09:59

## 2018-08-02 RX ADMIN — PANTOPRAZOLE SODIUM SCH MG: 40 TABLET, DELAYED RELEASE ORAL at 09:58

## 2018-08-03 VITALS — DIASTOLIC BLOOD PRESSURE: 71 MMHG | SYSTOLIC BLOOD PRESSURE: 100 MMHG | HEART RATE: 81 BPM | TEMPERATURE: 97.7 F

## 2018-08-03 RX ADMIN — PANTOPRAZOLE SODIUM SCH MG: 40 TABLET, DELAYED RELEASE ORAL at 09:46

## 2018-08-03 RX ADMIN — Medication SCH TAB: at 09:45

## 2018-09-25 ENCOUNTER — HOSPITAL ENCOUNTER (EMERGENCY)
Dept: HOSPITAL 31 - C.ER | Age: 53
LOS: 1 days | Discharge: HOME | End: 2018-09-26
Payer: COMMERCIAL

## 2018-09-25 VITALS — BODY MASS INDEX: 33.5 KG/M2

## 2018-09-25 DIAGNOSIS — F10.229: Primary | ICD-10-CM
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
## 2018-09-26 VITALS
SYSTOLIC BLOOD PRESSURE: 107 MMHG | DIASTOLIC BLOOD PRESSURE: 69 MMHG | OXYGEN SATURATION: 96 % | HEART RATE: 88 BPM | TEMPERATURE: 98.1 F

## 2018-09-26 VITALS — RESPIRATION RATE: 17 BRPM

## 2018-10-03 ENCOUNTER — HOSPITAL ENCOUNTER (INPATIENT)
Dept: HOSPITAL 31 - C.ER | Age: 53
LOS: 7 days | Discharge: HOME | DRG: 918 | End: 2018-10-10
Attending: PSYCHIATRY & NEUROLOGY | Admitting: PSYCHIATRY & NEUROLOGY
Payer: COMMERCIAL

## 2018-10-03 VITALS — BODY MASS INDEX: 33.5 KG/M2

## 2018-10-03 DIAGNOSIS — Z87.891: ICD-10-CM

## 2018-10-03 DIAGNOSIS — E78.00: ICD-10-CM

## 2018-10-03 DIAGNOSIS — T44.7X2A: Primary | ICD-10-CM
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
## 2018-10-03 DIAGNOSIS — E11.9: ICD-10-CM

## 2018-10-03 DIAGNOSIS — I10: ICD-10-CM

## 2018-10-03 DIAGNOSIS — F10.230: ICD-10-CM

## 2018-10-03 DIAGNOSIS — J44.9: ICD-10-CM

## 2018-10-03 DIAGNOSIS — F10.220: ICD-10-CM

## 2018-10-03 DIAGNOSIS — F25.0: ICD-10-CM

## 2018-10-03 DIAGNOSIS — F41.9: ICD-10-CM

## 2018-10-03 DIAGNOSIS — G47.30: ICD-10-CM
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
## 2018-10-03 LAB
ALBUMIN SERPL-MCNC: 4.5 G/DL (ref 3.5–5)
ALBUMIN/GLOB SERPL: 1.7 {RATIO} (ref 1–2.1)
ALT SERPL-CCNC: 87 U/L (ref 21–72)
AST SERPL-CCNC: 54 U/L (ref 17–59)
BASOPHILS # BLD AUTO: 0 K/UL (ref 0–0.2)
BASOPHILS NFR BLD: 0.4 % (ref 0–2)
BILIRUB UR-MCNC: NEGATIVE MG/DL
BUN SERPL-MCNC: 15 MG/DL (ref 9–20)
CALCIUM SERPL-MCNC: 9.9 MG/DL (ref 8.6–10.4)
EOSINOPHIL # BLD AUTO: 0.1 K/UL (ref 0–0.7)
EOSINOPHIL NFR BLD: 0.7 % (ref 0–4)
ERYTHROCYTE [DISTWIDTH] IN BLOOD BY AUTOMATED COUNT: 15.4 % (ref 11.5–14.5)
GFR NON-AFRICAN AMERICAN: > 60
GLUCOSE UR STRIP-MCNC: NORMAL MG/DL
HGB BLD-MCNC: 14.9 G/DL (ref 12–18)
LEUKOCYTE ESTERASE UR-ACNC: (no result) LEU/UL
LYMPHOCYTES # BLD AUTO: 1.7 K/UL (ref 1–4.3)
LYMPHOCYTES NFR BLD AUTO: 24.7 % (ref 20–40)
MCH RBC QN AUTO: 30.9 PG (ref 27–31)
MCHC RBC AUTO-ENTMCNC: 33.8 G/DL (ref 33–37)
MCV RBC AUTO: 91.5 FL (ref 80–94)
MONOCYTES # BLD: 0.7 K/UL (ref 0–0.8)
MONOCYTES NFR BLD: 9.7 % (ref 0–10)
NEUTROPHILS # BLD: 4.5 K/UL (ref 1.8–7)
NEUTROPHILS NFR BLD AUTO: 64.5 % (ref 50–75)
NRBC BLD AUTO-RTO: 0 % (ref 0–2)
PH UR STRIP: 6 [PH] (ref 5–8)
PLATELET # BLD: 278 K/UL (ref 130–400)
PMV BLD AUTO: 8.3 FL (ref 7.2–11.7)
PROT UR STRIP-MCNC: NEGATIVE MG/DL
RBC # BLD AUTO: 4.82 MIL/UL (ref 4.4–5.9)
RBC # UR STRIP: NEGATIVE /UL
SP GR UR STRIP: 1 (ref 1–1.03)
UROBILINOGEN UR-MCNC: NORMAL MG/DL (ref 0.2–1)
WBC # BLD AUTO: 6.9 K/UL (ref 4.8–10.8)

## 2018-10-03 PROCEDURE — GZHZZZZ GROUP PSYCHOTHERAPY: ICD-10-PCS | Performed by: PSYCHIATRIC UNIT

## 2018-10-03 PROCEDURE — GZ56ZZZ INDIVIDUAL PSYCHOTHERAPY, SUPPORTIVE: ICD-10-PCS | Performed by: PSYCHIATRIC UNIT

## 2018-10-03 RX ADMIN — INSULIN GLARGINE SCH UNITS: 100 INJECTION, SOLUTION SUBCUTANEOUS at 21:36

## 2018-10-04 VITALS — OXYGEN SATURATION: 95 %

## 2018-10-04 RX ADMIN — PANTOPRAZOLE SODIUM SCH MG: 40 TABLET, DELAYED RELEASE ORAL at 19:16

## 2018-10-04 RX ADMIN — INSULIN GLARGINE SCH UNITS: 100 INJECTION, SOLUTION SUBCUTANEOUS at 21:48

## 2018-10-05 RX ADMIN — PANTOPRAZOLE SODIUM SCH MG: 40 TABLET, DELAYED RELEASE ORAL at 10:27

## 2018-10-05 RX ADMIN — INSULIN GLARGINE SCH UNITS: 100 INJECTION, SOLUTION SUBCUTANEOUS at 21:05

## 2018-10-05 RX ADMIN — Medication SCH TAB: at 10:34

## 2018-10-06 RX ADMIN — PANTOPRAZOLE SODIUM SCH MG: 40 TABLET, DELAYED RELEASE ORAL at 09:51

## 2018-10-06 RX ADMIN — INSULIN GLARGINE SCH UNITS: 100 INJECTION, SOLUTION SUBCUTANEOUS at 21:55

## 2018-10-06 RX ADMIN — Medication SCH TAB: at 09:51

## 2018-10-07 RX ADMIN — INSULIN GLARGINE SCH UNITS: 100 INJECTION, SOLUTION SUBCUTANEOUS at 22:27

## 2018-10-07 RX ADMIN — PANTOPRAZOLE SODIUM SCH MG: 40 TABLET, DELAYED RELEASE ORAL at 10:26

## 2018-10-07 RX ADMIN — Medication SCH TAB: at 10:26

## 2018-10-08 RX ADMIN — Medication SCH TAB: at 10:32

## 2018-10-08 RX ADMIN — PANTOPRAZOLE SODIUM SCH MG: 40 TABLET, DELAYED RELEASE ORAL at 10:32

## 2018-10-08 RX ADMIN — INSULIN GLARGINE SCH UNITS: 100 INJECTION, SOLUTION SUBCUTANEOUS at 22:17

## 2018-10-09 LAB — T4 FREE SERPL-MCNC: 0.9 NG/DL (ref 0.78–2.19)

## 2018-10-09 RX ADMIN — Medication SCH TAB: at 09:20

## 2018-10-09 RX ADMIN — INSULIN GLARGINE SCH UNITS: 100 INJECTION, SOLUTION SUBCUTANEOUS at 21:41

## 2018-10-09 RX ADMIN — PANTOPRAZOLE SODIUM SCH MG: 40 TABLET, DELAYED RELEASE ORAL at 09:20

## 2018-10-10 VITALS
SYSTOLIC BLOOD PRESSURE: 117 MMHG | HEART RATE: 74 BPM | TEMPERATURE: 97.8 F | RESPIRATION RATE: 18 BRPM | DIASTOLIC BLOOD PRESSURE: 76 MMHG

## 2018-10-10 RX ADMIN — Medication SCH TAB: at 09:00

## 2018-10-10 RX ADMIN — PANTOPRAZOLE SODIUM SCH MG: 40 TABLET, DELAYED RELEASE ORAL at 09:00

## 2018-10-16 ENCOUNTER — HOSPITAL ENCOUNTER (INPATIENT)
Dept: HOSPITAL 42 - ED | Age: 53
LOS: 16 days | Discharge: HOME | DRG: 885 | End: 2018-11-01
Attending: PSYCHIATRY & NEUROLOGY | Admitting: PSYCHIATRY & NEUROLOGY
Payer: COMMERCIAL

## 2018-10-16 DIAGNOSIS — E66.01: ICD-10-CM

## 2018-10-16 DIAGNOSIS — F17.200: ICD-10-CM

## 2018-10-16 DIAGNOSIS — E11.65: ICD-10-CM

## 2018-10-16 DIAGNOSIS — F41.9: ICD-10-CM

## 2018-10-16 DIAGNOSIS — K59.00: ICD-10-CM

## 2018-10-16 DIAGNOSIS — R56.9: ICD-10-CM
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
## 2018-10-16 DIAGNOSIS — I10: ICD-10-CM

## 2018-10-16 DIAGNOSIS — F25.0: Primary | ICD-10-CM

## 2018-10-16 DIAGNOSIS — Z79.899: ICD-10-CM

## 2018-10-16 DIAGNOSIS — F31.60: ICD-10-CM

## 2018-10-16 DIAGNOSIS — G47.00: ICD-10-CM

## 2018-10-16 DIAGNOSIS — G47.33: ICD-10-CM

## 2018-10-16 DIAGNOSIS — K21.9: ICD-10-CM

## 2018-10-16 DIAGNOSIS — F10.20: ICD-10-CM

## 2018-10-16 DIAGNOSIS — Z79.4: ICD-10-CM

## 2018-10-16 DIAGNOSIS — J44.9: ICD-10-CM

## 2018-10-16 LAB
ALBUMIN SERPL-MCNC: 4.5 G/DL (ref 3–4.8)
ALBUMIN/GLOB SERPL: 1.6 {RATIO} (ref 1.1–1.8)
ALT SERPL-CCNC: 55 U/L (ref 7–56)
APAP SERPL-MCNC: < 10 UG/ML (ref 10–20)
APPEARANCE UR: CLEAR
AST SERPL-CCNC: 35 U/L (ref 17–59)
BACTERIA #/AREA URNS HPF: (no result) /[HPF]
BASOPHILS # BLD AUTO: 0.01 K/MM3 (ref 0–2)
BASOPHILS NFR BLD: 0.1 % (ref 0–3)
BILIRUB UR-MCNC: NEGATIVE MG/DL
BUN SERPL-MCNC: 18 MG/DL (ref 7–21)
CALCIUM SERPL-MCNC: 9.1 MG/DL (ref 8.4–10.5)
COLOR UR: YELLOW
EOSINOPHIL # BLD: 0.1 10*3/UL (ref 0–0.7)
EOSINOPHIL NFR BLD: 0.8 % (ref 1.5–5)
ERYTHROCYTE [DISTWIDTH] IN BLOOD BY AUTOMATED COUNT: 14.1 % (ref 11.5–14.5)
GFR NON-AFRICAN AMERICAN: > 60
GLUCOSE UR STRIP-MCNC: 500 MG/DL
GRANULOCYTES # BLD: 5.58 10*3/UL (ref 1.4–6.5)
GRANULOCYTES NFR BLD: 66.8 % (ref 50–68)
HGB BLD-MCNC: 14.6 G/DL (ref 14–18)
LEUKOCYTE ESTERASE UR-ACNC: NEGATIVE LEU/UL
LYMPHOCYTES # BLD: 2.3 10*3/UL (ref 1.2–3.4)
LYMPHOCYTES NFR BLD AUTO: 27.5 % (ref 22–35)
MCH RBC QN AUTO: 30.5 PG (ref 25–35)
MCHC RBC AUTO-ENTMCNC: 33.7 G/DL (ref 31–37)
MCV RBC AUTO: 90.4 FL (ref 80–105)
MONOCYTES # BLD AUTO: 0.4 10*3/UL (ref 0.1–0.6)
MONOCYTES NFR BLD: 4.8 % (ref 1–6)
PH UR STRIP: 5.5 [PH] (ref 4.7–8)
PLATELET # BLD: 309 10^3/UL (ref 120–450)
PMV BLD AUTO: 9.4 FL (ref 7–11)
PROT UR STRIP-MCNC: (no result) MG/DL
RBC # BLD AUTO: 4.79 10^6/UL (ref 3.5–6.1)
RBC # UR STRIP: NEGATIVE /UL
RBC #/AREA URNS HPF: (no result) /HPF (ref 0–2)
SALICYLATES SERPL-MCNC: < 1 MG/DL (ref 2–20)
SP GR UR STRIP: >= 1.03 (ref 1–1.03)
UROBILINOGEN UR STRIP-ACNC: 0.2 E.U./DL
WBC # BLD AUTO: 8.4 10^3/UL (ref 4.5–11)

## 2018-10-16 RX ADMIN — INSULIN HUMAN SCH: 100 INJECTION, SOLUTION PARENTERAL at 21:26

## 2018-10-16 RX ADMIN — INSULIN DETEMIR SCH U: 100 INJECTION, SOLUTION SUBCUTANEOUS at 21:26

## 2018-10-16 NOTE — ED PDOC
Arrival/HPI





- General


Chief Complaint: Psychiatric Evaluation


Time Seen by Provider: 10/16/18 09:22


Historian: Patient





- History of Present Illness


Narrative History of Present Illness (Text): 





10/16/18 09:39


53yo male with pmhx of depression and anxiety in ED for ED for ECT. Patient 

states he was advised to come to ED for ECT, when he  was discharged form [facility] on October 10th. He admits to SI, states he is always suicidal.  He 

notes that he is complaint with his antidepressants. Denies hallucination and 

SI. Denies any somatic complaint.





Past Medical History





- Provider Review


Nursing Documentation Reviewed: Yes





- Cardiac


Hx Cardiac Disorders: Yes


Hx Hypertension: Yes





- Endocrine/Metabolic


Hx Endocrine Disorders: Yes


Hx Diabetes Mellitus Type 2: Yes





- Psychiatric


Hx Psychophysiologic Disorder: Yes


Hx Anxiety: Yes


Hx Depression: Yes


Hx Substance Use: No





Family/Social History





- Physician Review


Nursing Documentation Reviewed: Yes


Family/Social History: Unknown Family HX


Smoking Status: Heavy Smoker > 10 Cigarettes Daily


Hx Alcohol Use: Yes


Frequency of alcohol use: Socially


Hx Substance Use: No





Allergies/Home Meds


Allergies/Adverse Reactions: 


Allergies





amitriptyline [From Elavil] Adverse Reaction (Verified 10/16/18 19:07)


   ITCHING








Home Medications: 


                                    Home Meds











 Medication  Instructions  Recorded  Confirmed


 


Atenolol 50 PO 10/16/18 


 


GlipiZIDE [Glipizide] 10 mg PO DAILY 10/16/18 10/16/18


 


MetFORMIN [glucOPHAGE] 1,000 mg PO BID 10/16/18 10/16/18


 


Pantoprazole Sodium [Protonix] 40 mg PO DAILY 10/16/18 10/16/18


 


QUEtiapine [SEROquel] 300 mg PO HS 10/16/18 10/16/18


 


Ziprasidone [Geodon] 60 mg PO BID 10/16/18 10/16/18














Review of Systems





- Physician Review


All systems were reviewed & negative as marked: Yes





- Review of Systems


Constitutional: Normal


Eyes: Normal


ENT: Normal


Respiratory: Normal


Cardiovascular: Normal


Gastrointestinal: Normal


Genitourinary Male: Normal


Musculoskeletal: Normal


Skin: Normal


Neurological: Normal


Endocrine: Normal


Hemo/Lymphatic: Normal


Psychiatric: Depression, Suicidal Ideation





Physical Exam


Vital Signs Reviewed: Yes





Vital Signs











  Temp Pulse Resp BP Pulse Ox


 


 10/16/18 09:15  97.8 F  98 H  16  125/87  94 L











Temperature: Afebrile


Blood Pressure: Normal


Pulse: Regular


Respiratory Rate: Normal


Appearance: Positive for: Well-Appearing, Non-Toxic, Comfortable


Pain Distress: None


Mental Status: Positive for: Alert and Oriented X 3





- Systems Exam


Head: Present: Atraumatic, Normocephalic


Pupils: Present: PERRL


Extroacular Muscles: Present: EOMI


Conjunctiva: Present: Normal


Mouth: Present: Moist Mucous Membranes


Neck: Present: Normal Range of Motion


Respiratory/Chest: Present: Clear to Auscultation, Good Air Exchange.  No: 

Respiratory Distress, Accessory Muscle Use


Cardiovascular: Present: Regular Rate and Rhythm, Normal S1, S2.  No: Murmurs


Abdomen: No: Tenderness, Distention, Peritoneal Signs


Back: Present: Normal Inspection


Upper Extremity: Present: Normal Inspection.  No: Cyanosis, Edema


Lower Extremity: Present: Normal Inspection.  No: Edema


Neurological: Present: GCS=15, CN II-XII Intact, Speech Normal


Skin: Present: Warm, Dry, Normal Color.  No: Rashes


Psychiatric: Present: Alert, Oriented x 3, Normal Insight, Normal Concentration





Medical Decision Making


ED Course and Treatment: 





10/16/18 21:08


Pt was seen in ED for stated history





Lab was reviewed and he was medically cleared for psychiatric evaluation





He was seen in ED by KANIKA Ernandez and was admitted to Dr. Kline 's service 

for Major depression





Disposition/Present on Arrival





- Present on Arrival


Any Indicators Present on Arrival: No


History of DVT/PE: No


History of Uncontrolled Diabetes: No


Urinary Catheter: No


History of Decub. Ulcer: No


History Surgical Site Infection Following: None





- Disposition


Have Diagnosis and Disposition been Completed?: Yes


Diagnosis: 


 Major depression





Disposition: HOSPITALIZED


Disposition Time: 11:40


Patient Plan: Admission


Patient Problems: 


                             Current Active Problems











Problem Status Onset


 


Major depression Acute 











Condition: STABLE

## 2018-10-16 NOTE — PCM.BM
<Jagruti Crespo - Last Filed: 10/16/18 19:04>





Treatment Plan Problems





- Problems identified on initial assessmt


  ** INEFFECTIVE INDV COPING


Date Initiated: 10/16/18


Time Initiated: 19:00


Assessment reference: NA


Status: Active


Priority: 1





  ** HOPELESS/HELPLESS


Date Initiated: 10/16/18


Time Initiated: 19:00


Assessment reference: NA


Status: Active


Priority: 2





  ** FEELING WORTHLESS


Date Initiated: 10/16/18


Time Initiated: 19:00


Assessment reference: NA


Status: Active


Priority: 3





  ** ALTERATION IN SLEEP


Date Initiated: 10/16/18


Time Initiated: 19:00


Assessment reference: NA


Status: Active


Priority: 4





  ** KNOWLEDGE DEFICIT ALCOHOL USE


Date Initiated: 10/16/18


Time Initiated: 19:00


Assessment reference: NA


Status: Active


Priority: 5





Treatment assets and liabiliti


Patient Assests: cooperative, ADL independent, good support system, cognitively 

intact


Patient Liabilities: substance abuse





- Milieu Protocol


Maintain good personal hygiene: every shift Encourage regular showers, every 

shift Remind patient to perform daily oral care, every shift Assist patient to 

perform ADL's


Maintain personal safety: every other day Educate patient to report safety 

concerns to staff, every other day Monitor environment for contraband/sharps


Medication safety: Monitor for expected outcome, potential side effects: every 

other day, Assess barriers to learning: every other day, Assess readiness for 

medication education: every other day





Discharge/Continuing Care





- Education Needs


Education Needs: Patient Medication, Patient Diagnosis/Disease Process, Patient 

Coping Skills, Patient Community resources, Patient Activities of Daily Living, 

Patient Nutrition, Patient Uses of Medical Equipment, Patient Health 

Practices/Safety, Patient Personal Hygiene/Grooming, Patient Aftercare Safety 

Plan





- Discharge


Discharge Criteria: Tolerates medication w/o severe side effects, Free of 

agitation, Normal sleep pattern, Ability to care for self, No longer exhibiting 

s/s of withdrawal, Reduction of target symptoms


Discharge to:: Home





<Deb Islas - Last Filed: 10/18/18 15:37>





Family Contact


Family involvement: Family/SO is involved


Family contact: Patient agrees to contact


Family contact name: Emily Charlton(wife)


Family contacted how many times per week?: 2

## 2018-10-16 NOTE — RAD
Date of service: 



10/16/2018



HISTORY:

 admission 



COMPARISON:

No prior. 



FINDINGS:



LUNGS:

No active pulmonary disease.



PLEURA:

No significant pleural effusion identified, no pneumothorax apparent.



CARDIOVASCULAR:

Normal.



OSSEOUS STRUCTURES:

No significant abnormalities.



VISUALIZED UPPER ABDOMEN:

Normal.



OTHER FINDINGS:

None.



IMPRESSION:

No active disease.

## 2018-10-16 NOTE — CARD
--------------- APPROVED REPORT --------------





Date of service: 10/16/2018



EKG Measurement

Heart [id number]

[id number]

[id number]

[id number]

[id number]



<Conclusion>

Normal sinus rhythm

Rightward axis

Low voltage QRS

Borderline ECG

## 2018-10-16 NOTE — PN
DATE:  10/16/2018



CLINICAL NOTE



SUBJECTIVE:  The patient is in the Behavioral Care Unit.  The patient is in

room 518, bed 1.  The patient was admitted today.  Diagnosis is depression.

The patient has been treated for depression for the last 5 years.  He says

he has been retired from _____ .  His past history is significant that he

has history of diabetes, hypertension.  The patient has history of

alcoholism.  He drinks a pint of vodka everyday.  He smokes also.  His past

history includes ventral hernia, right side of the lower abdomen.  The

patient has history of gastritis.  He says that he has been on Protonix in

the past.  His medication list consists of Ativan 1 mg every 6 hours.  The

patient is on magnesium hydroxide for constipation.  He gets Seroquel 100 mg at

night.  The patient is on thiamine and vitamin D1.  His lab work, the blood

sugar is 235.  His magnesium of 1.5, it is low.  The patient's urine, there

is evidence of sugar in the urine.



PHYSICAL EXAMINATION:

HEENT:  The patient's head is normocephalic.

GENERAL:  Seemed to be pleasant.

NECK:  Thyroid is not enlarged clinically.

HEART:  Normal sinus rhythm.  S1 and S2 present.

LUNGS:  Trachea central.  Breath sounds vesicular.  No adventitious sounds

heard.

ABDOMEN:  Soft, distended.  Liver and spleen not clinically palpable.

CNS:  No focal deficits are noted.



LABORATORY DATA:  The patient's blood work, the hemoglobin is 14.6.  His

differential is normal.  Chemistry:  The patient's sugar is 235.  His

magnesium of 1.5 which is low.  GFR is within normal limits.  He has

pending x-ray of the chest.  The patient's EKG shows nonspecific ST-T wave

changes.



At this time, we will cover the patient with insulin and check his lipid

levels and monitor his sugar and also his blood pressure. At this point, his

blood pressure seems to be within normal range, but since the patient is a 
diabetic,

he needs to take an ACE inhibitor or ARB.  We will place the patient on a 
medication 

and follow up.







__________________________________________

Manuel Tam MD



DD:  10/16/2018 16:48:42

DT:  10/16/2018 17:17:51

[facility] # [id number]

MTDD

## 2018-10-17 RX ADMIN — THERA TABS SCH TAB: TAB at 09:12

## 2018-10-17 RX ADMIN — INSULIN HUMAN SCH UNIT: 100 INJECTION, SOLUTION PARENTERAL at 12:12

## 2018-10-17 RX ADMIN — PANTOPRAZOLE SODIUM SCH MG: 40 TABLET, DELAYED RELEASE ORAL at 09:13

## 2018-10-17 RX ADMIN — INSULIN HUMAN SCH: 100 INJECTION, SOLUTION PARENTERAL at 21:16

## 2018-10-17 RX ADMIN — INSULIN DETEMIR SCH U: 100 INJECTION, SOLUTION SUBCUTANEOUS at 21:16

## 2018-10-17 RX ADMIN — INSULIN HUMAN SCH UNIT: 100 INJECTION, SOLUTION PARENTERAL at 17:09

## 2018-10-17 RX ADMIN — INSULIN HUMAN SCH: 100 INJECTION, SOLUTION PARENTERAL at 08:20

## 2018-10-17 RX ADMIN — Medication SCH MG: at 09:12

## 2018-10-17 NOTE — PN
DATE:  10/16/2018



SUBJECTIVE:  The patient is in the Behavioral Care Unit under the care of

the behavioral care physician, Dr. Raisa Rascon.  The patient was

admitted with depression and was seen this morning.  He seemed to be

comfortable.  He does not feel very disturbed at this moment.  The patient

is waiting to have ECT evaluation and treatment.



PHYSICAL EXAMINATION:

VITAL SIGNS:  This morning, the patient's pulse is 77, blood pressure

150/97, respirations are 20, the patient's temperature 97.3.

HEENT:  The patient's examination of the head is normocephalic.

LUNGS:  Clear.

HEART:  Normal sinus rhythm.  S1, S2 present.

ABDOMEN:  Soft.  Liver and spleen not palpable.  No tenderness.  Distention

is present.

CNS:  No focal deficits are noted.



MEDICATIONS:  The patient's medications are placed.  At this time, the

patient is on metformin 1000 mg b.i.d., glipizide 10 mg daily, insulin

coverage with Levemir 40 units at night.  The patient gets insulin coverage

four times a day for elevated blood sugar.  The patient is on Ativan 1 mg

every 6 hours.  The patient is getting magnesium oxide 400 mg b.i.d.

because he had low magnesium level in the blood.  The patient is also

getting Seroquel 100 mg at night.  Diet is 2 g sodium diabetic diet.  The

patient also gets atenolol 50 mg daily.



LABORATORY DATA:  The blood work done yesterday in the lab, the CBC

was within normal range.  The patient's chemistry, the blood sugar was

elevated.  Magnesium of 1.5.



ASSESSMENT AND PLAN:  We will continue current medical management and we

will wait for the Behavioral Care Unit physician to schedule the patient

for ECT.





__________________________________________

Manuel Tam MD





DD:  10/17/2018 8:17:57

DT:  10/17/2018 8:46:46

Job # [id number]

GARRY

## 2018-10-18 RX ADMIN — INSULIN HUMAN SCH UNIT: 100 INJECTION, SOLUTION PARENTERAL at 12:39

## 2018-10-18 RX ADMIN — INSULIN DETEMIR SCH U: 100 INJECTION, SOLUTION SUBCUTANEOUS at 21:41

## 2018-10-18 RX ADMIN — INSULIN HUMAN SCH UNIT: 100 INJECTION, SOLUTION PARENTERAL at 17:40

## 2018-10-18 RX ADMIN — PANTOPRAZOLE SODIUM SCH MG: 40 TABLET, DELAYED RELEASE ORAL at 08:39

## 2018-10-18 RX ADMIN — INSULIN HUMAN SCH UNIT: 100 INJECTION, SOLUTION PARENTERAL at 08:40

## 2018-10-18 RX ADMIN — Medication SCH MG: at 08:39

## 2018-10-18 RX ADMIN — INSULIN HUMAN SCH: 100 INJECTION, SOLUTION PARENTERAL at 22:17

## 2018-10-18 RX ADMIN — THERA TABS SCH TAB: TAB at 08:40

## 2018-10-18 NOTE — PCM.PYCHPN
Psychiatric Progress Note





- Psychiatric Progress Note


Patient seen today, length of contact: 30 minutes


Patient Chief Complaint: 





"I need to have ECT, I came here for that"


Problems Identified/Issues Discussed: 


Suicide/ homicide prevention, past psychiatric h/o, current psychiatric 

symptoms, medical problems, risk/benefits and alternatives of medications, 

medications compliance, coping strategies, substance abuse h/o, relapse 

prevention, importance of follow up with psychiatrist and therapist, discharge 

plan. 








Medical Problems: 


see HPI





Diagnostic Results: 














                                 10/16/18 09:30 





                                 10/16/18 09:30 





                                   Lab Results





10/18/18 07:22: POC Glucose (mg/dL) 172 H


10/17/18 21:09: POC Glucose (mg/dL) 199 H


10/17/18 16:29: POC Glucose (mg/dL) 215 H


10/17/18 11:05: POC Glucose (mg/dL) 221 H


10/17/18 07:19: POC Glucose (mg/dL) 140 H


10/16/18 21:13: POC Glucose (mg/dL) 263 H


10/16/18 16:33: POC Glucose (mg/dL) 224 H


10/16/18 10:20: Urine Opiates Screen Negative, Urine Methadone Screen Negative, 

Ur Barbiturates Screen Negative, Ur Phencyclidine Scrn Negative, Ur Amphetamines

Screen Negative, U Benzodiazepines Scrn Positive H, U Oth Cocaine Metabols 

Negative, U Cannabinoids Screen Negative


10/16/18 10:20: Urine Color Yellow, Urine Appearance Clear, Urine pH 5.5, Ur 

Specific Gravity >= 1.030, Urine Protein Trace H, Urine Glucose (UA) 500 H, 

Urine Ketones 15 H, Urine Blood Negative, Urine Nitrate Negative, Urine 

Bilirubin Negative, Urine Urobilinogen 0.2, Ur Leukocyte Esterase Negative, 

Urine RBC 0 - 2, Urine WBC 1 - 3, Urine Bacteria Mod


10/16/18 09:30: Alcohol, Quantitative 134 H


10/16/18 09:30: Salicylates < 1 L, Acetaminophen < 10.0 L


10/16/18 09:30: Sodium 139, Potassium 4.0, Chloride 99, Carbon Dioxide 25, Anion

Gap 19, BUN 18, Creatinine 0.9, Est GFR (African Amer) > 60, Est GFR (Non-Af 

Amer) > 60, Random Glucose 235 H, Calcium 9.1, Magnesium 1.5 L, Total Bilirubin 

0.3, AST 35, ALT 55, Alkaline Phosphatase 74, Total Protein 7.3, Albumin 4.5, 

Globulin 2.8, Albumin/Globulin Ratio 1.6


10/16/18 09:30: WBC 8.4, RBC 4.79, Hgb 14.6, Hct 43.3, MCV 90.4, MCH 30.5, MCHC 

33.7, RDW 14.1, Plt Count 309, MPV 9.4, Gran % 66.8, Lymph % (Auto) 27.5, Mono %

(Auto) 4.8, Eos % (Auto) 0.8 L, Baso % (Auto) 0.1, Gran # 5.58, Lymph # (Auto) 

2.3, Mono # (Auto) 0.4, Eos # (Auto) 0.1, Baso # (Auto) 0.01








Vital Signs











  Temp Pulse Resp BP Pulse Ox


 


 10/18/18 07:29  97.7 F  76  20  111/69 


 


 10/17/18 16:00   74   139/92 H 


 


 10/17/18 09:13   77   150/97 H 


 


 10/17/18 09:11   77   150/97 H 


 


 10/17/18 07:00  97.3 F L  77  20  150/97 H 


 


 10/16/18 15:42    17  


 


 10/16/18 13:45      96


 


 10/16/18 13:40   90  17  128/80  96


 


 10/16/18 12:53   88  16  125/78  96


 


 10/16/18 11:27   90  16  127/80  96


 


 10/16/18 10:00   94 H  17  126/85  96


 


 10/16/18 09:15  97.8 F  98 H  16  125/87  94 L











DSM 5 Symptoms Update: 


shortly pt is 26yo male with reported h/o schizoaffective disorder, bipolar 

type, h/o alcohol use disorder, h/o multiple previous psychiatric admissions 

(most recent was [facility] in October 10/10/2018, pt came to the [facility] c/o worsening 

of his depression, suicidal ideation, pt made a request for ECT treatment, prior

to this admission pt had a plan to overdose on pills, pt's wife called [facility] clinic and find out that ECT treatment available in this Hospital.







pt was seen at the treatment team meeting, pt presented with poor personal 

hygiene, acceptable ADLs. 





pt was seen and examined, previous record reviewed, pt's med list discussed with

pt. 


pt was recently d/c from the [facility] on October 10/10/2018, pt reported 

not feeling any better, pt reported to feel depressed and "always feeling 

suicidal". 





Denies any homicidal ideation or A/V/H. Poor sleep, fair appetite. Pt reported 

that he was "not drinking that much", at the same time alcohol level was 

elevated in ED. 





pt was prescribed the following meds:


Atenolol 10 mg PO BID 09/17/18 


Lantus 45 units SQ DAILY 09/17/18 


MetFORMIN 1,000 mg PO BID 09/17/18 


Simvastatin 40 mg PO DAILY 09/17/18 


QUEtiapine [Seroquel] 300 mg PO HS #30 tab 09/21/18 


Ziprasidone [Geodon Cap] 80 mg PO BID #60 cap 09/21/18 


pt reported being compliant wiht meds. 





pt ha multiple psych admissions to [facility], h/o of suicidal attempts most 

recent was prior [facility] Hospitalization. 





pt contracted for safety.





reported h/o smoking about 1-1/2 pack a day, refused nicotine patch, but offered

PRN.





Impression:


as per h/o: schizoaffective, bipolar type


r/o substance induced mood disorder


h/o alcohol abuse/dependence





Medication Change: Yes (seroquel increased)


Medical Record Reviewed: Yes


Consults ordered or reviewed: 





medical team evaluated pt





Mental Status Examination





- Cognitive Function


Orientation: Person, Place, Situation


Memory: Intact


Attention: Poor


Concentration: Poor


Association: Loose


Fund of Knowledge: Poor





- Mood


Mood: Depressed, Anxious





- Affect


Affect: Constricted





- Speech


Speech: Appropriate





- Formal Thought Process


Formal Thought Process: No Impairment





- Suicidal Ideation


Suicidal Ideation: Yes


Plan: 





no intent, pt contracted for safety








- Homicidal Ideation


Homicidal Ideation: No





Goal/Treatment Plan





- Goal/Treatment Plan


Need for Continued Stay: Remain at risks for inpatient hospitalization, Severe 

depression anxiety, Discharge may exacerbated symptoms, Failed transitioning, 

Severe functional impairment


Progress Toward Problem(s) and Goals/Treatment Plan: 


Milieu/structure/supportive therapy 


Medical consult appreciated, see medical team note for more detailed info 


SW consultation for discharge plan and social issues 


Med management 


seroquel resumed


ativan 1mg po qid scheduled


MVI/thiamine/folic acid


Family involvement 


Follow up on labs 


Will monitor closely 


Pt was educated about risk/benefits and alternatives of medications, coping 

strategies (safety plan, suicide prevention), relapse prevention, importance of 

follow up with psychiatrist and therapist, stay away from drugs/alcohol/smoking





Estimated Date of D/C: 10/23/18

## 2018-10-18 NOTE — PN
DATE:  10/18/2018



SUBJECTIVE:  The patient is in Behavioral Care Unit room 518, bed 1.  He

was admitted with depression.  He is being managed medically for diabetes

and hypertension and for depression, the patient is being monitored and

treated by the Behavioral Care physician.  The patient has been scheduled

for ECT, but the date is not planned yet.



PHYSICAL EXAMINATION:

VITAL SIGNS:  The pulse is 76, blood pressure 110/70, respirations are 20. 

The patient's O2 sat is 98% on oxygen on room air.

HEENT:  The patient's head is normocephalic.

NECK:  The thyroid is not enlarged.  JVP is flat.

HEART:  Normal sinus rhythm.  S1, S2 present.

LUNGS:  Trachea central.  Breath sounds vesicular, no adventitious sounds.

ABDOMEN:  Soft, obesity present.

CNS:  The patient is conscious/well oriented.  No focal neurologic deficits

are noted.  The patient is able to ambulate.  His cerebellar functions are

within normal limits.



LABORATORY DATA:  The patient's blood work shows white count of 8000,

otherwise the peripheral smear was 9000.  Chemistry:  The patient's blood

sugar today is 199.



MEDICATIONS:  His list of medications consist of Ativan 4 times a day.  The

patient gets folic acid, metformin, glipizide, insulin coverage, magnesium

oxide.  The patient is on Seroquel, atenolol and multivitamin.  His diet is

heart-healthy diabetic diet.



ASSESSMENT:  The patient's condition is stable, though he has acute

depression.  His EKG shows normal sinus rhythm with nonspecific ST-T wave

changes, low voltage QRS.  The patient's chest x-ray is clear.



PLAN:  The patient is cleared for ECT when it is planned for the time

according to the Behavioral Care Unit.







__________________________________________

Manuel Tam MD



DD:  10/18/2018 8:30:41

DT:  10/18/2018 9:41:47

Job # [id number]

MTDD

## 2018-10-19 LAB
BUN SERPL-MCNC: 15 MG/DL (ref 7–21)
CALCIUM SERPL-MCNC: 9.4 MG/DL (ref 8.4–10.5)
GFR NON-AFRICAN AMERICAN: > 60

## 2018-10-19 RX ADMIN — PANTOPRAZOLE SODIUM SCH MG: 40 TABLET, DELAYED RELEASE ORAL at 08:59

## 2018-10-19 RX ADMIN — INSULIN HUMAN SCH: 100 INJECTION, SOLUTION PARENTERAL at 08:55

## 2018-10-19 RX ADMIN — INSULIN HUMAN SCH: 100 INJECTION, SOLUTION PARENTERAL at 22:18

## 2018-10-19 RX ADMIN — INSULIN HUMAN SCH UNIT: 100 INJECTION, SOLUTION PARENTERAL at 17:41

## 2018-10-19 RX ADMIN — INSULIN HUMAN SCH UNIT: 100 INJECTION, SOLUTION PARENTERAL at 13:45

## 2018-10-19 RX ADMIN — Medication SCH MG: at 09:01

## 2018-10-19 RX ADMIN — INSULIN DETEMIR SCH U: 100 INJECTION, SOLUTION SUBCUTANEOUS at 22:18

## 2018-10-19 RX ADMIN — THERA TABS SCH TAB: TAB at 08:58

## 2018-10-19 NOTE — PN
DATE:  10/19/2018



SUBJECTIVE:  The patient is in the Behavioral Care Unit.  The patient was

admitted with depression, pending ECT treatment.  The patient has past

history of diabetes, hypertension and also chronic alcoholism.  According

to his story, he says he drinks a pint of vodka everyday.



PHYSICAL EXAMINATION:

GENERAL:  This morning, he is awake.  He says that he does not sleep, but

according to the nurse, the patient sleeps pretty well.

VITAL SIGNS:  Pulse is 77, blood pressure 105/54, respirations are 20, O2

sat is 98% on room air.

HEENT:  Examination of the head is normocephalic.

NECK:  The thyroid is not enlarged.  Carotid pulses are present.

LUNGS:  Trachea central.  Breath sounds vesicular.  No adventitious sounds.

HEART:  Normal sinus rhythm.  S1 and S2 present.

ABDOMEN:  Soft.  Liver and spleen not palpable.

CNS:  He has no focal neurological deficit.



MEDICATIONS:  His list of medications consist of Ativan, folic acid,

metformin, glipizide, insulin coverage, magnesium oxide.  The patient is on

pantoprazole, Seroquel and Tenormin.



LABORATORY DATA:  His blood work, the patient's blood sugar was 225 and he

is on insulin coverage for elevated blood sugar, but he is on multiple

hypocalcemic agents and a diabetic diet.



ASSESSMENT AND PLAN:  We will continue current management.  We will follow

up.





__________________________________________

Manuel Tam MD





DD:  10/19/2018 8:08:01

DT:  10/19/2018 8:14:42

Job # [id number]

[email or web address]

## 2018-10-19 NOTE — PCM.PYCHPN
Psychiatric Progress Note





- Psychiatric Progress Note


Patient seen today, length of contact: 30 minutes


Patient Chief Complaint: 





"I need to have ECT, I came here for that, I am very hopeless"


Problems Identified/Issues Discussed: 


Suicide/ homicide prevention, past psychiatric h/o, current psychiatric 

symptoms, medical problems, risk/benefits and alternatives of medications, 

medications compliance, coping strategies, substance abuse h/o, relapse 

prevention, importance of follow up with psychiatrist and therapist, discharge 

plan. 








Medical Problems: 


see HPI





Diagnostic Results: 














                                 10/16/18 09:30 





                                 10/16/18 09:30 





                                   Lab Results





10/18/18 07:22: POC Glucose (mg/dL) 172 H


10/17/18 21:09: POC Glucose (mg/dL) 199 H


10/17/18 16:29: POC Glucose (mg/dL) 215 H


10/17/18 11:05: POC Glucose (mg/dL) 221 H


10/17/18 07:19: POC Glucose (mg/dL) 140 H


10/16/18 21:13: POC Glucose (mg/dL) 263 H


10/16/18 16:33: POC Glucose (mg/dL) 224 H


10/16/18 10:20: Urine Opiates Screen Negative, Urine Methadone Screen Negative, 

Ur Barbiturates Screen Negative, Ur Phencyclidine Scrn Negative, Ur Amphetamines

Screen Negative, U Benzodiazepines Scrn Positive H, U Oth Cocaine Metabols 

Negative, U Cannabinoids Screen Negative


10/16/18 10:20: Urine Color Yellow, Urine Appearance Clear, Urine pH 5.5, Ur 

Specific Gravity >= 1.030, Urine Protein Trace H, Urine Glucose (UA) 500 H, 

Urine Ketones 15 H, Urine Blood Negative, Urine Nitrate Negative, Urine 

Bilirubin Negative, Urine Urobilinogen 0.2, Ur Leukocyte Esterase Negative, 

Urine RBC 0 - 2, Urine WBC 1 - 3, Urine Bacteria Mod


10/16/18 09:30: Alcohol, Quantitative 134 H


10/16/18 09:30: Salicylates < 1 L, Acetaminophen < 10.0 L


10/16/18 09:30: Sodium 139, Potassium 4.0, Chloride 99, Carbon Dioxide 25, Anion

Gap 19, BUN 18, Creatinine 0.9, Est GFR (African Amer) > 60, Est GFR (Non-Af 

Amer) > 60, Random Glucose 235 H, Calcium 9.1, Magnesium 1.5 L, Total Bilirubin 

0.3, AST 35, ALT 55, Alkaline Phosphatase 74, Total Protein 7.3, Albumin 4.5, 

Globulin 2.8, Albumin/Globulin Ratio 1.6


10/16/18 09:30: WBC 8.4, RBC 4.79, Hgb 14.6, Hct 43.3, MCV 90.4, MCH 30.5, MCHC 

33.7, RDW 14.1, Plt Count 309, MPV 9.4, Gran % 66.8, Lymph % (Auto) 27.5, Mono %

(Auto) 4.8, Eos % (Auto) 0.8 L, Baso % (Auto) 0.1, Gran # 5.58, Lymph # (Auto) 

2.3, Mono # (Auto) 0.4, Eos # (Auto) 0.1, Baso # (Auto) 0.01








Vital Signs











  Temp Pulse Resp BP Pulse Ox


 


 10/18/18 07:29  97.7 F  76  20  111/69 


 


 10/17/18 16:00   74   139/92 H 


 


 10/17/18 09:13   77   150/97 H 


 


 10/17/18 09:11   77   150/97 H 


 


 10/17/18 07:00  97.3 F L  77  20  150/97 H 


 


 10/16/18 15:42    17  


 


 10/16/18 13:45      96


 


 10/16/18 13:40   90  17  128/80  96


 


 10/16/18 12:53   88  16  125/78  96


 


 10/16/18 11:27   90  16  127/80  96


 


 10/16/18 10:00   94 H  17  126/85  96


 


 10/16/18 09:15  97.8 F  98 H  16  125/87  94 L











DSM 5 Symptoms Update: 


shortly pt is 24yo male with reported h/o schizoaffective disorder, bipolar 

type, h/o alcohol use disorder, h/o multiple previous psychiatric admissions 

(most recent was [facility] in October 10/10/2018, pt came to the [facility] c/o worsening 

of his depression, suicidal ideation, pt made a request for ECT treatment, prior

to this admission pt had a plan to overdose on pills, pt's wife called [facility] clinic and find out that ECT treatment available in this Hospital.







pt was seen in his room, patient presented to be irritable, annoyed, patient was

asked about ECT treatment, off note ECT machine needed to be replaced by the 

[employer] company due to inability to save the record. pt reported to be depressed 

and hopeless.





Patient reported previous good response on Celexa, willing to resume that 

medication.





pt presented with poor personal hygiene, acceptable ADLs. 





pt ha multiple psych admissions to [facility], h/o of suicidal attempts most 

recent was prior [facility] Hospitalization. 





pt contracted for safety.





As per staff, patient is self isolating, depressed, irritable and paranoid, but 

no agitation or aggression.





Impression:


as per h/o: schizoaffective, bipolar type


r/o substance induced mood disorder


h/o alcohol abuse/dependence





Medication Change: Yes (seroquel increased, ativan decreased)


Medical Record Reviewed: Yes


Consults ordered or reviewed: 





medical team evaluated pt


pulmonology evaluated patient, patient needs to be on nebulizer as well as CPAP 

machine





Mental Status Examination





- Cognitive Function


Orientation: Person, Place, Situation


Memory: Intact


Attention: Poor


Concentration: Poor


Association: Loose


Fund of Knowledge: Poor





- Mood


Mood: Depressed, Anxious





- Affect


Affect: Constricted





- Speech


Speech: Appropriate





- Formal Thought Process


Formal Thought Process: No Impairment





- Suicidal Ideation


Suicidal Ideation: Yes





- Homicidal Ideation


Homicidal Ideation: No





Goal/Treatment Plan





- Goal/Treatment Plan


Need for Continued Stay: Remain at risks for inpatient hospitalization, Severe 

depression anxiety, Discharge may exacerbated symptoms, Failed transitioning, 

Severe functional impairment


Progress Toward Problem(s) and Goals/Treatment Plan: 


Milieu/structure/supportive therapy 


Medical consult appreciated, see medical team note for more detailed info 


SW consultation for discharge plan and social issues 


Med management 


seroquel resumed


ativan 1mg po tid scheduled, ith the plan to wean it off


Celexa was started 10 mg daily for depression and anxiety


Possible ECT treatment next week


MVI/thiamine/folic acid


Family involvement 


Follow up on labs 


Will monitor closely 


Pt was educated about risk/benefits and alternatives of medications, coping 

strategies (safety plan, suicide prevention), relapse prevention, importance of 

follow up with psychiatrist and therapist, stay away from drugs/alcohol/smoking





Estimated Date of D/C: 10/23/18

## 2018-10-19 NOTE — HP
HISTORY OF PRESENT ILLNESS:  The patient is a 52-year-old white male with a

reported past history of depression and anxiety who was transferred from

[facility] for consideration of ECT.



He had been discharged from [facility] on 10/10/2018.



The patient indicated that he is presently suicidal as he is always

suicidal, but is somewhat vague about a plan.



It was observed that he is compliant with his antidepressants.



The patient's medical history is positive for diabetes.



He has been maintained on glipizide 10 mg daily, metformin 1000 mg b.i.d.,

also Protonix 40 mg daily, atenolol 50 mg daily, Seroquel 300 mg at bedtime

and Geodon 60 mg b.i.d.



The patient does appear to be alert, oriented, slightly anxious, soft

spoken, depressed, not psychotic.



He does express feelings of being lonely and not having control over his

life and avoidant of people.



The patient does indicate that he drinks alcohol for a long time.  His

treatment for this is unsure.



The patient indicates that his father is a source of support to him.



The patient indicated that he has been under psychiatric care for his

depression for 5 years.



His past medical history is significant for diabetes, hypertension and

alcoholism.  He indicated that he drank a pint of vodka every day for an

extended period of time and has smoked.



He has also had a history of an abdominal ventral hernia and a history of

gastritis.



His CBC and differential were within normal limits.



A urine toxicology screen was positive for benzodiazepines, for a blood

alcohol level of 134 mg percent.



Urinalysis showed trace protein, 500 urine glucose, 15 urine ketones.



A biochemical profile shows elevated glucose of 215 mg percent.



DIAGNOSES:  Alcohol use disorder; depression, not otherwise specified,

possibly secondary to chronic alcohol use.



The patient apparently has been admitted for consideration of ECT.



Review of systems was normal for 10 systems.



PHYSICAL EXAMINATION:

HEENT:  The patient was normocephalic.  Pupils equal, round and reactive to

light and accommodation.  Mouth moist, no masses, no exudates.

NECK:  Supple, no jugular vein distention.

RESPIRATORY:  Clear to auscultation and percussion.  Good air exchange.

CARDIOVASCULAR:  Heart, regular rate and rhythm.  S1/S2.  No murmurs.  No

gallops.

ABDOMEN:  Soft.  No organomegaly.  No tenderness.

GENITALIA:  Deferred.

EXTREMITIES:  Symmetric, pedal pulses present.

NEUROLOGIC:  Cranial nerves II to XII grossly intact, speech normal.

SKIN:  Warm and dry.



IMPRESSION:  The patient for treatment of depression.





__________________________________________

Bonifacio Farmer MD/ PhD





DD:  10/17/2018 23:39:27

DT:  10/17/2018 23:44:04

Job # [id number]

## 2018-10-20 RX ADMIN — THERA TABS SCH TAB: TAB at 08:41

## 2018-10-20 RX ADMIN — INSULIN HUMAN SCH UNIT: 100 INJECTION, SOLUTION PARENTERAL at 16:49

## 2018-10-20 RX ADMIN — Medication SCH MG: at 08:41

## 2018-10-20 RX ADMIN — FLUTICASONE PROPIONATE AND SALMETEROL SCH: 50; 500 POWDER RESPIRATORY (INHALATION) at 08:00

## 2018-10-20 RX ADMIN — INSULIN HUMAN SCH: 100 INJECTION, SOLUTION PARENTERAL at 21:56

## 2018-10-20 RX ADMIN — INSULIN HUMAN SCH UNIT: 100 INJECTION, SOLUTION PARENTERAL at 08:29

## 2018-10-20 RX ADMIN — INSULIN HUMAN SCH UNIT: 100 INJECTION, SOLUTION PARENTERAL at 12:27

## 2018-10-20 RX ADMIN — PANTOPRAZOLE SODIUM SCH MG: 40 TABLET, DELAYED RELEASE ORAL at 08:41

## 2018-10-20 RX ADMIN — INSULIN DETEMIR SCH U: 100 INJECTION, SOLUTION SUBCUTANEOUS at 21:56

## 2018-10-20 RX ADMIN — FLUTICASONE PROPIONATE AND SALMETEROL SCH PUFF: 50; 500 POWDER RESPIRATORY (INHALATION) at 18:26

## 2018-10-20 NOTE — PCM.PYCHPN
Psychiatric Progress Note





- Psychiatric Progress Note


Patient seen today, length of contact: 30 minutes


Problems Identified/Issues Discussed: 


I reviewed assessment and recent notes. Patient was interviewed at bedside. He 

is superficially cooperative, affect is constricted, mildly irritable but 

generally respectful. He reports continued depression, fatigue anxiety "always 

there". He is still agreeable to start ECT and remains aware of his current 

psychiatric medication regimen when reviewed this morning. Denies any side 

effects or new discomfort or pain. Staff note that patient can be isolative at 

times but has been more visible and attending groups. There were no behavioral 

issues overnight. 








Diagnostic Results: 





as per h/o: schizoaffective, bipolar type


r/o substance induced mood disorder


h/o alcohol abuse/dependence





Medication Change: Yes (Ativan decreased to 1 mg AM/HS )


Medical Record Reviewed: Yes





Mental Status Examination





- Cognitive Function


Orientation: Person, Place, Situation


Memory: Intact


Attention: WNL


Concentration: Poor


Association: Loose


Fund of Knowledge: Poor





- Mood


Mood: Depressed, Anxious





- Affect


Affect: Constricted





- Speech


Speech: Appropriate





- Formal Thought Process


Formal Thought Process: No Impairment





- Suicidal Ideation


Suicidal Ideation: No





- Homicidal Ideation


Homicidal Ideation: No





Goal/Treatment Plan





- Goal/Treatment Plan


Need for Continued Stay: Remain at risks for inpatient hospitalization, Severe 

depression anxiety, Discharge may exacerbated symptoms, Failed transitioning, 

Severe functional impairment


Progress Toward Problem(s) and Goals/Treatment Plan: 





* c/w current tx and plan


* Ativan taper to 1 mg AMHS today 


* No new lab results thus far this weekend


* Vitals reviewed and noted below:


                                                                Selected Entries











  10/19/18 10/19/18 10/19/18





  07:43 16:30 23:00


 


Temperature 97.9 F  


 


Pulse Rate 77 76 76


 


Respiratory 20  





Rate   


 


Blood Pressure 105/54 L 135/91 H 








Estimated Date of D/C: 10/23/18

## 2018-10-20 NOTE — PN
DATE:  10/20/2018



SUBJECTIVE:  Patient is seen in Behavioral Care Unit, [facility] in [place], room 518, bed 1.  Patient admitted with depression,

pending ECT treatment.  Patient is on multiple medications.  Patient is

seen this morning.  He seems to be lying in bed comfortably.  He answers

all the questions.  He says he is tired.



PHYSICAL EXAMINATION:

VITAL SIGNS:  Patient's pulse is 92, blood pressure 113/63, respirations

are 19 per minute.

HEENT:  Patient's head is normocephalic.

LUNGS:  Clear.

HEART:  Normal sinus rhythm.  S1, S2 present.

ABDOMEN:  Soft.  Liver and spleen not palpable.

CENTRAL NERVOUS SYSTEM:  no focal deficits.



LIST OF MEDICATIONS:  Patient has Advair for chronic obstructive lung

disease.  Patient is on Ativan 1 mg three times a day.  Patient is on

Celexa 10 mg daily, Folic acid 1 mg daily.  He is on metformin 1000 mg

b.i.d., glipizide 10 mg daily, insulin coverage for elevated blood sugar. 

Patient is also on magnesium oxide, pantoprazole.  Patient gets

quetiapine which is Seroquel 300 mg at night, atenolol 50 mg daily, 

lisinopril 2.5 mg daily and thiamine 100 mg daily.  He is also on Ventolin

inhalation therapy.





__________________________________________

Manuel Tam MD



DD:  10/20/2018 8:34:17

DT:  10/20/2018 9:03:10

Job # [id number]

[email or web address]

## 2018-10-20 NOTE — CON
DATE:  10/19/2018



REFERRING PHYSICIAN:  Dr. Kline.



REASON FOR CONSULTATION:  Chronic obstructive lung disease and obstructive

sleep apnea syndrome.



HISTORY OF PRESENT ILLNESS:  This is a 52-year-old gentleman with past

medical history of depression/anxiety disorder.  He claimed he is suicidal.

Denied any hallucination.  Does have some cough, active smoker.  Claimed

that he has sleep apnea syndrome, has CPAP at home, does not know exact

pressures, has a loud snoring, daytime sleepy and tired.



PAST MEDICAL HISTORY:  Major depression/anxiety disorder, chronic lung

disease, sleep apnea syndrome, obesity, hypertension, diabetes.



FAMILY HISTORY:  No significant cardiopulmonary disease reported.



SOCIAL HISTORY:  Positive history of smoking.  Does drink alcohol

excessively.  No substance abuse.



ALLERGIES:  AMITRIPTYLINE.



MEDICATIONS:  He is on lorazepam 1 mg three times a day, Celexa 10 mg

daily, folic acid 1 mg daily, metformin 1000 mg twice a day, glipizide 10

mg daily, Levemir 40 units subcu at bedtime, Maalox 30 mg daily p.r.n., Mag

oxide 400 mg daily, Protonix 40 mg daily, Seroquel 300 mg at bedtime,

atenolol 50 mg daily, multivitamins daily, Zestril 2.5 mg daily.



REVIEW OF SYSTEMS:  No headache.  No rhinitis.  Does have some cough and

shortness of breath with exertion.  No chest pain.  No nausea, no vomiting,

no diarrhea, leg pain or leg swelling.  Admits to have snoring, daytime

sleepy and tired.



PHYSICAL EXAMINATION:

GENERAL:  Sitting up in a chair, in no acute distress.

VITAL SIGNS:  Temperature is 98, heart rate 77, respiratory rate is 20,

blood pressure 135/91.

HEENT:  Moist mucous membranes.  Small oral cavity.  Edentulous. 

Mallampati score is 4.

NECK:  Short, thick neck.

LUNGS:  Have scattered rhonchi, prolonged expiratory phase.

HEART:  S1 and S2.

ABDOMEN:  Soft, nontender, nondistended.

EXTREMITIES:  No edema.

NEUROLOGIC:  Awake and follows simple commands.



LABORATORY DATA:  Shows hemoglobin 14.6, hematocrit 43.3, WBC 8.4, platelet

is 309,000.  Sodium 135, potassium 4.2, chloride 98, bicarbonate 28, BUN

15, creatinine 0.9, glucose 227, calcium is 9.4, magnesium 1.4.



Had chest x-ray done in emergency room, shows no active pulmonary disease.



IMPRESSION AND PLAN:  Probably schizoaffective disorder, chronic

obstructive lung disease, sleep apnea syndrome, morbid obesity.  Case

discussed with Dr. Kline, spoke to nursing staff.  We will place him on CPAP

8 cm while sleeping.  We will add inhaled bronchodilator.  Continue gastric

prophylaxis.  Urged him to stop smoking.  Continue therapy.



Thank you and we will follow with you.





__________________________________________

Zhane Joshi MD







DD:  10/19/2018 21:27:51

DT:  10/19/2018 21:32:36

Job # [id number]

## 2018-10-21 RX ADMIN — INSULIN HUMAN SCH: 100 INJECTION, SOLUTION PARENTERAL at 21:24

## 2018-10-21 RX ADMIN — PANTOPRAZOLE SODIUM SCH MG: 40 TABLET, DELAYED RELEASE ORAL at 08:33

## 2018-10-21 RX ADMIN — Medication SCH MG: at 08:32

## 2018-10-21 RX ADMIN — INSULIN HUMAN SCH UNIT: 100 INJECTION, SOLUTION PARENTERAL at 16:22

## 2018-10-21 RX ADMIN — Medication SCH MG: at 16:22

## 2018-10-21 RX ADMIN — INSULIN HUMAN SCH UNIT: 100 INJECTION, SOLUTION PARENTERAL at 12:43

## 2018-10-21 RX ADMIN — THERA TABS SCH TAB: TAB at 08:33

## 2018-10-21 RX ADMIN — FLUTICASONE PROPIONATE AND SALMETEROL SCH PUFF: 50; 500 POWDER RESPIRATORY (INHALATION) at 08:36

## 2018-10-21 RX ADMIN — INSULIN HUMAN SCH UNIT: 100 INJECTION, SOLUTION PARENTERAL at 08:33

## 2018-10-21 RX ADMIN — FLUTICASONE PROPIONATE AND SALMETEROL SCH PUFF: 50; 500 POWDER RESPIRATORY (INHALATION) at 17:28

## 2018-10-21 RX ADMIN — INSULIN DETEMIR SCH U: 100 INJECTION, SOLUTION SUBCUTANEOUS at 21:16

## 2018-10-21 NOTE — PN
DATE:  10/21/2018



SUBJECTIVE:  The patient is admitted with depression.  He has chronic

alcoholism.  The patient has history of chronic obstructive lung disease. 

The patient has history of diabetes, hypertension.  He is seen this

morning.  He is on a CPAP machine for sleep apnea and chronic lung disease.



PHYSICAL EXAMINATION:

VITAL SIGNS:  The pulse is 74, blood pressure 120/70, respirations are 20,

O2 sat is 98% on room air.  The patient's temperature is 97.7.

HEENT:  The patient's head is normocephalic.

NECK:  The thyroid is not enlarged.  Carotid pulses are present.

LUNGS:  Trachea central.  Breath sounds are vesicular and diminished

bilaterally.

HEART:  Normal sinus rhythm.

ABDOMEN:  Soft, obesity present.  Distention of the abdomen noted.

CNS:  The patient is conscious, rational, and oriented.



MEDICATIONS:  The patient's list of medications consists of Advair, the

patient is on twice a day.  The patient is on Ativan, Celexa, folic acid,

metformin, glipizide, insulin coverage.  The patient is on magnesium oxide

twice a day.  The patient is on pantoprazole, Seroquel, atenolol, albuterol

inhalation therapy, and thiamine 100 mg daily.



LABORATORY DATA:  The patient's blood work, there is no change.



PLAN:  The patient's condition is clinically improved.  The patient is

awaiting ECT treatment.







__________________________________________

Manuel Tam MD



DD:  10/21/2018 8:16:42

DT:  10/21/2018 9:31:00

Job # [id number]

## 2018-10-21 NOTE — PN
DATE:  10/21/2018



SUBJECTIVE:  Patient is seen in [facility]

Unit in room 518, bed 1.  Patient was admitted with depression, auditory

hallucination.  Patient has past history of mental disorder.  Patient also

has history of diabetes mellitus, chronic lung disease, and hypertension. 

Patient has been getting insulin for his diabetes and also other

hyperglycemic agents such as metformin.  Patient has past history of

hepatitis C.  Patient also has past history of skin rash.  He does not have

any rash at this time.  This morning, he seemed to be comfortable, lying

down in bed.  He answers all questions.



PHYSICAL EXAMINATION

VITAL SIGNS:  Pulse is 74, blood pressure 120/70, respirations are 20.

HEENT:  Patient's head is normocephalic.

LUNGS:  Clear.

HEART:  Normal sinus rhythm.

ABDOMEN:  Soft.  Liver and spleen not palpable.

CNS:  No focal deficits.



LABORATORY DATA:  Patient's blood was done.  There is no change.  The

chemistry is within normal limits.  Patient's blood sugar is 199.  Patient

has completed treatment for hepatitis C and he says the hepatitis C virus

is cleared from his blood.  Treated by Gastroenterology,  _____ in the

past.



MEDICATIONS:  Patient is on Advair twice a day.  Patient is on Ativan,

Celexa, folic acid, metformin, glipizide, insulin, magnesium oxide,

pantoprazole, Seroquel, atenolol, and multivitamin.



ASSESSMENT AND PLAN:  Patient seems to be clinically stable.  His overall

prognosis is guarded.  Patient needs treatment for his behavioral disorder

and also asthma, chronic lung disease, hypertension, and diabetes.







__________________________________________

Manuel Tam MD



DD:  10/21/2018 8:09:40

DT:  10/21/2018 9:16:43

Job # [id number]

## 2018-10-21 NOTE — PCM.PYCHPN
Psychiatric Progress Note





- Psychiatric Progress Note


Patient seen today, length of contact: 30 minutes


Problems Identified/Issues Discussed: 


I reviewed  recent notes and patient was interviewed at bedside again this 

morning. He remains oriented x3 and superficially cooperative with interview q

uestions. Utilized BIPAP last night and feels that he slept better. He reports 

continued depression, fatigue and anxiety. States that he always has these 

symptoms. Affect remains constricted, mildly irritable but generally respectful.

Appearance is unkempt. 


Patient remains agreeable to start ECT. Denies any side effects or new 

discomfort or pain. Staff note that patient can be isolative at times but has 

been more visible on the unit. He attends and participates in groups. There were

no behavioral issues over the weekend thus far.  





 


Diagnostic Results: 





as per h/o: schizoaffective, bipolar type


r/o substance induced mood disorder


h/o alcohol abuse/dependence





Medication Change: Yes (Ativan decreased to 1 mg AM/HS on 10/20/18)


Medical Record Reviewed: Yes





Mental Status Examination





- Cognitive Function


Orientation: Person, Place, Situation


Memory: Intact


Attention: WNL


Concentration: Poor


Association: Loose


Fund of Knowledge: Poor





- Mood


Mood: Depressed, Anxious





- Affect


Affect: Constricted





- Speech


Speech: Appropriate





- Formal Thought Process


Formal Thought Process: No Impairment





- Suicidal Ideation


Suicidal Ideation: No





- Homicidal Ideation


Homicidal Ideation: No





Goal/Treatment Plan





- Goal/Treatment Plan


Need for Continued Stay: Remain at risks for inpatient hospitalization, Severe 

depression anxiety, Discharge may exacerbated symptoms, Failed transitioning, 

Severe functional impairment


Progress Toward Problem(s) and Goals/Treatment Plan: 





* c/w current tx and plan


* Appreciate f/u by Dr. Lebron/Adi and Dr. Tam on 10/20/18~no new 

  recommendations


* Ativan tapered to 1 mg AMHS on 10/20/18 


* No new lab results thus far this weekend


* Vitals reviewed and noted below: 


                                                                Selected Entries











  10/20/18 10/20/18





  06:55 20:28


 


Temperature 98.1 F 


 


Pulse Rate 92 H 82


 


Respiratory 19 





Rate  


 


Blood Pressure 113/63 106/63








Estimated Date of D/C: 10/23/18

## 2018-10-21 NOTE — PN
DATE:  10/21/2018



PULMONARY PROGRESS NOTE



REFERRING PHYSICIAN:  Raisa Rascon MD.



SUBJECTIVE:  He is sitting up in a chair.  Night was unremarkable. 

Tolerated BiPAP well.  Cough is okay.  No nausea.  No vomiting, diarrhea,

leg pain or leg swelling.



OBJECTIVE:

GENERAL:  In no acute distress.

VITAL SIGNS:  Temperature is 98, heart rate is 73, respiratory rate is 18,

blood pressure 128/76, pulse ox 96% on room air a few days ago.

HEENT:  Moist mucous membrane.  Crowded airway.  Mallampati score is 4.

NECK:  Supple.  No JVD.

LUNGS:  Have a fair airflow with few rhonchi.

HEART:  S1 and S2.

ABDOMEN:  Soft and nontender.  No organomegaly.

EXTREMITIES:  No edema.

NEUROLOGICAL:   Awake and alert.  Follows simple command.



MEDICATIONS:  He is on Advair 500/50 one puff twice a day, lorazepam 1 mg

a.m. and at bedtime, Celexa is 10 mg daily, folic acid 1 mg daily,

metformin 1000 mg twice a day, glipizide 10 mg daily, insulin coverage,

Maalox p.r.n. basis, mag oxide 400 mg twice a day, Protonix 40 mg daily,

Seroquel 300 mg at bedtime, Tenormin 50 mg daily, multivitamins daily,

Tylenol p.r.n. basis, Ventolin HFA two puffs every 4 hours p.r.n., vitamin

B1 at 100 mg daily, Zestril 2.5 mg daily.



LABORATORY DATA:  Blood sugar yesterday was 192.



IMPRESSION AND PLAN:  Schizoaffective disorder, chronic obstructive lung

disease, sleep apnea syndrome, morbid obesity, history of suicidal

ideation.  Pulmonary point of view, doing well.  Continue continuous

positive airway pressure while sleeping.  Keep head at 45 degrees.  Careful

with sedation.  P.o. and inhaled bronchodilator.  Continue therapy.  Fall

precaution.



Thank you and we will follow with you.





__________________________________________

Zhane Joshi MD





DD:  10/21/2018 18:16:50

DT:  10/21/2018 19:33:34

Job # [id number]

## 2018-10-21 NOTE — PN
DATE:  10/20/2018



PULMONARY PROGRESS NOTE



REFERRING PHYSICIAN:  Raisa Rasocn MD.



SUBJECTIVE:  The patient feels okay, tolerated CPAP last night.  No

overnight events reported.  No headache, chest pain, nausea, vomiting, leg

pain, leg swelling reported.



PHYSICAL EXAMINATION:

VITAL SIGNS:  Temperature 98.1, pulse 92, respirations 19, blood pressure

113/63.

GENERAL:  No acute distress.

HEENT:  Moist mucous membrane.  Crowded airway.  Mallampati score is 4.

NECK:  Short, thick.  No JVD.

LUNGS:  Scattered rhonchi.

CARDIAC:  S1 and S2.

ABDOMEN:  Soft, nontender.  No organomegaly.

EXTREMITIES:  No edema.

NEUROLOGICAL:  Awake, alert.  Follows commands.



LABORATORY DATA:  Laboratory results reviewed.  No new data.  Bedside

glucose 199.  No other data reviewed.



MEDICATIONS:  Reviewed.  Ativan 1 mg in the morning and nighttime,

citalopram 10 mg daily, Advair one puff every 12 hours.  No other changes

since yesterday.



IMPRESSION AND PLAN:  Schizoaffective disorder, chronic obstructive lung

disease, sleep apnea syndrome, morbid obesity, suicidal ideation.  Continue

to encourage CPAP while sleeping, keep head elevated at 45-degrees, inhale

bronchodilators, gastric prophylaxis, smoking cessation, therapy as

tolerated.  This patient was examined with Dr. Joshi and discussed

assessment and plan as described above.



Thank you for this consult and we will follow with you.







__________________________________________

DOMENICA Marie







__________________________________________

Zhane Joshi MD



DD:  10/20/2018 18:55:32

DT:  10/20/2018 22:34:13

Job # [id number]

## 2018-10-22 RX ADMIN — INSULIN HUMAN SCH UNIT: 100 INJECTION, SOLUTION PARENTERAL at 08:36

## 2018-10-22 RX ADMIN — FLUTICASONE PROPIONATE AND SALMETEROL SCH PUFF: 50; 500 POWDER RESPIRATORY (INHALATION) at 08:37

## 2018-10-22 RX ADMIN — INSULIN HUMAN SCH: 100 INJECTION, SOLUTION PARENTERAL at 21:41

## 2018-10-22 RX ADMIN — FLUTICASONE PROPIONATE AND SALMETEROL SCH PUFF: 50; 500 POWDER RESPIRATORY (INHALATION) at 18:00

## 2018-10-22 RX ADMIN — Medication SCH MG: at 17:38

## 2018-10-22 RX ADMIN — PANTOPRAZOLE SODIUM SCH MG: 40 TABLET, DELAYED RELEASE ORAL at 08:36

## 2018-10-22 RX ADMIN — INSULIN DETEMIR SCH U: 100 INJECTION, SOLUTION SUBCUTANEOUS at 22:02

## 2018-10-22 RX ADMIN — THERA TABS SCH TAB: TAB at 08:36

## 2018-10-22 RX ADMIN — Medication SCH MG: at 08:36

## 2018-10-22 RX ADMIN — INSULIN HUMAN SCH UNIT: 100 INJECTION, SOLUTION PARENTERAL at 12:28

## 2018-10-22 RX ADMIN — INSULIN HUMAN SCH: 100 INJECTION, SOLUTION PARENTERAL at 17:39

## 2018-10-22 NOTE — PN
DATE:  10/22/2018



PULMONARY PROGRESS NOTE



REFERRING PHYSICIAN:  Raisa Rascon MD



SUBJECTIVE:  He is sitting up in a chair.  Night was unremarkable,

tolerated CPAP well.  Feels better.  No nausea, no vomiting, no diarrhea. 

No leg pain or leg swelling.



OBJECTIVE:

GENERAL:  No acute distress.

VITAL SIGNS:  Temperature is 98, heart 77, respiratory rate is 20, blood

pressure 123/79.

HEENT:  Moist mucous membranes.  No ulcer or thrush.

NECK:  Supple.  No JVD.

LUNGS:  Have a fair airflow with rhonchi.

HEART:  S1, S2.

ABDOMEN:  Soft, nontender.  No organomegaly.

EXTREMITIES:  No edema.

NEUROLOGIC:  Awake, alert, follows simple command.



MEDICATIONS:  He is on Advair 500/50 one puff twice a day, Celexa 20 mg

daily, folic acid 1 mg daily, metformin 1000 mg twice a day, glipizide 10

mg daily, insulin coverage, Levemir 40 units subcu at bedtime, mag oxide

400 mg twice a day,  milk of magnesia 30 mL daily, Protonix 40 mg daily,

Seroquel 300 mg at bedtime, Tenormin 50 mg daily, multivitamins daily,

Tylenol p.r.n., Ventolin HFA 2 puffs every 6 hours p.r.n., vitamin B1 at

100 mg daily, Zestril 2.5 mg daily.



LABORATORY DATA:  Reviewed.  Blood sugar today is 146.



IMPRESSION  AND PLAN:  Schizoaffective disorder, chronic obstructive lung

disease, sleep apnea syndrome, morbid obesity, history of suicidal

ideation.  Pulmonary point of view, doing well.  Continue CPAP while

sleeping.  Keep head at 45 degrees.  Continue inhaled bronchodilator,

gastric prophylaxis.  Fall precaution.  Spoke to nursing staff.



Thank you and we will follow with you.







__________________________________________

Zhane Joshi MD







DD:  10/22/2018 22:35:46

DT:  10/22/2018 22:37:49

Job # [id number]

## 2018-10-22 NOTE — PCM.PYCHPN
Psychiatric Progress Note





- Psychiatric Progress Note


Patient seen today, length of contact: 30 minutes


Patient Chief Complaint: 





"I feel depressed, the only hope is ECT"


Problems Identified/Issues Discussed: 


Suicide/ homicide prevention, past psychiatric h/o, current psychiatric 

symptoms, medical problems, risk/benefits and alternatives of medications, 

medications compliance, coping strategies, substance abuse h/o, relapse 

prevention, importance of follow up with psychiatrist and therapist, discharge 

plan. 








Medical Problems: 


see HPI





Diagnostic Results: 














                                 10/16/18 09:30 





                                 10/16/18 09:30 





                                   Lab Results





10/18/18 07:22: POC Glucose (mg/dL) 172 H


10/17/18 21:09: POC Glucose (mg/dL) 199 H


10/17/18 16:29: POC Glucose (mg/dL) 215 H


10/17/18 11:05: POC Glucose (mg/dL) 221 H


10/17/18 07:19: POC Glucose (mg/dL) 140 H


10/16/18 21:13: POC Glucose (mg/dL) 263 H


10/16/18 16:33: POC Glucose (mg/dL) 224 H


10/16/18 10:20: Urine Opiates Screen Negative, Urine Methadone Screen Negative, 

Ur Barbiturates Screen Negative, Ur Phencyclidine Scrn Negative, Ur Amphetamines

Screen Negative, U Benzodiazepines Scrn Positive H, U Oth Cocaine Metabols 

Negative, U Cannabinoids Screen Negative


10/16/18 10:20: Urine Color Yellow, Urine Appearance Clear, Urine pH 5.5, Ur 

Specific Gravity >= 1.030, Urine Protein Trace H, Urine Glucose (UA) 500 H, 

Urine Ketones 15 H, Urine Blood Negative, Urine Nitrate Negative, Urine 

Bilirubin Negative, Urine Urobilinogen 0.2, Ur Leukocyte Esterase Negative, 

Urine RBC 0 - 2, Urine WBC 1 - 3, Urine Bacteria Mod


10/16/18 09:30: Alcohol, Quantitative 134 H


10/16/18 09:30: Salicylates < 1 L, Acetaminophen < 10.0 L


10/16/18 09:30: Sodium 139, Potassium 4.0, Chloride 99, Carbon Dioxide 25, Anion

Gap 19, BUN 18, Creatinine 0.9, Est GFR (African Amer) > 60, Est GFR (Non-Af 

Amer) > 60, Random Glucose 235 H, Calcium 9.1, Magnesium 1.5 L, Total Bilirubin 

0.3, AST 35, ALT 55, Alkaline Phosphatase 74, Total Protein 7.3, Albumin 4.5, 

Globulin 2.8, Albumin/Globulin Ratio 1.6


10/16/18 09:30: WBC 8.4, RBC 4.79, Hgb 14.6, Hct 43.3, MCV 90.4, MCH 30.5, MCHC 

33.7, RDW 14.1, Plt Count 309, MPV 9.4, Gran % 66.8, Lymph % (Auto) 27.5, Mono %

(Auto) 4.8, Eos % (Auto) 0.8 L, Baso % (Auto) 0.1, Gran # 5.58, Lymph # (Auto) 

2.3, Mono # (Auto) 0.4, Eos # (Auto) 0.1, Baso # (Auto) 0.01








Vital Signs











  Temp Pulse Resp BP Pulse Ox


 


 10/18/18 07:29  97.7 F  76  20  111/69 


 


 10/17/18 16:00   74   139/92 H 


 


 10/17/18 09:13   77   150/97 H 


 


 10/17/18 09:11   77   150/97 H 


 


 10/17/18 07:00  97.3 F L  77  20  150/97 H 


 


 10/16/18 15:42    17  


 


 10/16/18 13:45      96


 


 10/16/18 13:40   90  17  128/80  96


 


 10/16/18 12:53   88  16  125/78  96


 


 10/16/18 11:27   90  16  127/80  96


 


 10/16/18 10:00   94 H  17  126/85  96


 


 10/16/18 09:15  97.8 F  98 H  16  125/87  94 L











                                        











Temp Pulse Resp BP Pulse Ox


 


 97.3 F L  69   20   105/63   96 


 


 10/22/18 07:19  10/22/18 08:36  10/22/18 07:19  10/22/18 08:36  10/16/18 13:45





EKG nonspecific changes.





DSM 5 Symptoms Update: 


shortly pt is 26yo male with reported h/o schizoaffective disorder, bipolar 

type, h/o alcohol use disorder, h/o multiple previous psychiatric admissions 

(most recent was [facility] in October 10/10/2018, pt came to the [facility] c/o worsening 

of his depression, suicidal ideation, pt made a request for ECT treatment, prior

to this admission pt had a plan to overdose on pills, pt's wife called [facility] clinic and find out that ECT treatment available in this Hospital.







pt was seen at the treatment team meeting today, pt said he is not doing any 

better, pt said that he has depression for the past 10 years, pt has underlying 

suicidal ideation, when this writer asked if his alcoholism is related to his 

symptoms pt said that he started to drink because of depression, pt said that he

was in [facility] for six months and he was feeling even worse being 

sober. pt insisted to have ECT treatment. pt was cleared by medical team for ECT

treatment. 





Patient reported previous good response on Celexa, meanwhile will increase dose 

today.





pt presented with poor personal hygiene, acceptable ADLs. 





pt ha multiple psych admissions to [facility], h/o of suicidal attempts most 

recent was prior [facility] Hospital[facility]. 





pt contracted for safety.





As per staff, patient is self isolating, depressed, irritable and paranoid, but 

no agitation or aggression.





Impression:


correction to diagnosis


h/o bipolar disorder, most recent episode mixed


r/o schizoaffective disorder bipolar type


r/o substance induced mood disorder


alohol abuse/dependence


Medication Change: Yes (Ativan d/c.)


Medical Record Reviewed: Yes


Consults ordered or reviewed: 





medical team evaluated pt


pulmonology evaluated patient, patient needs to be on nebulizer as well as CPAP 

machine





Mental Status Examination





- Cognitive Function


Orientation: Person, Place, Situation


Memory: Intact


Attention: WNL


Concentration: Poor


Association: Loose


Fund of Knowledge: Poor





- Mood


Mood: Depressed, Anxious





- Affect


Affect: Constricted





- Speech


Speech: Appropriate





- Formal Thought Process


Formal Thought Process: No Impairment





- Suicidal Ideation


Suicidal Ideation: No





- Homicidal Ideation


Homicidal Ideation: No





Goal/Treatment Plan





- Goal/Treatment Plan


Need for Continued Stay: Remain at risks for inpatient hospitalization, Severe 

depression anxiety, Discharge may exacerbated symptoms, Failed transitioning, 

Severe functional impairment


Progress Toward Problem(s) and Goals/Treatment Plan: 


Milieu/structure/supportive therapy 


Medical consult appreciated, see medical team note for more detailed info 


SW consultation for discharge plan and social issues 


Med management 


seroquel resumed


ativan 1mg po bid d/c


Celexa 20 mg daily for depression and anxiety


Possible ECT treatment tomorrow


MVI/thiamine/folic acid


Family involvement 


Follow up on labs 


Will monitor closely 


Pt was educated about risk/benefits and alternatives of medications, coping 

strategies (safety plan, suicide prevention), relapse prevention, importance of 

follow up with psychiatrist and therapist, stay away from drugs/alcohol/smoking





Estimated Date of D/C: 10/31/18

## 2018-10-22 NOTE — PN
DATE:  10/22/2018



SUBJECTIVE:  The patient was admitted to [facility]

Behavioral Care Unit with depression.  The patient has longstanding history

of alcoholism.  The patient also has history of diabetes and hypertension

and sleep apnea.  The patient has used CPAP at home.  The patient is seen

this morning.  He is resting in bed.  He states he is very lazy and tired.



PHYSICAL EXAMINATION:

VITAL SIGNS:  His pulse is 69, blood pressure 105/68, respirations are 20,

his O2 sat is 98% on room air.

LUNGS:  Clinically clear.  Breath sounds are diminished bilaterally.

HEART:  Normal sinus rhythm.

ABDOMEN:  Soft.  Liver and spleen not palpable.

CNS:  The patient's cranial nerves are intact.  Motor and sensory functions

within normal range.  The patient's cerebellar function is normal.



MEDICATIONS:  The patient's medications consist of Advair twice a day.  The

patient gets Ativan 4 times a day.  The patient is on Celexa, metformin for

diabetes, glipizide for diabetes, insulin for diabetes.  The patient is

treated with magnesium oxide for low magnesium level in the blood.  He gets

pantoprazole 40 mg for gastritis, Seroquel 300 mg at night, atenolol 50 mg

daily for blood pressure.



ASSESSMENT AND PLAN:  The patient's clinical condition is stable medically.

The patient gets his medications.  His lab work, the patient's chemistries,

blood sugar is 199 today.  We will continue current management.





__________________________________________

Manuel Tam MD



DD:  10/22/2018 8:11:55

DT:  10/22/2018 8:27:30

Job # [id number]

## 2018-10-23 PROCEDURE — GZB4ZZZ OTHER ELECTROCONVULSIVE THERAPY: ICD-10-PCS | Performed by: PSYCHIATRY & NEUROLOGY

## 2018-10-23 RX ADMIN — INSULIN HUMAN SCH: 100 INJECTION, SOLUTION PARENTERAL at 21:13

## 2018-10-23 RX ADMIN — INSULIN HUMAN SCH UNIT: 100 INJECTION, SOLUTION PARENTERAL at 17:10

## 2018-10-23 RX ADMIN — FLUTICASONE PROPIONATE AND SALMETEROL SCH PUFF: 50; 500 POWDER RESPIRATORY (INHALATION) at 07:10

## 2018-10-23 RX ADMIN — FLUTICASONE PROPIONATE AND SALMETEROL SCH PUFF: 50; 500 POWDER RESPIRATORY (INHALATION) at 17:28

## 2018-10-23 RX ADMIN — PANTOPRAZOLE SODIUM SCH: 40 TABLET, DELAYED RELEASE ORAL at 12:55

## 2018-10-23 RX ADMIN — INSULIN DETEMIR SCH U: 100 INJECTION, SOLUTION SUBCUTANEOUS at 21:55

## 2018-10-23 RX ADMIN — Medication SCH MG: at 17:27

## 2018-10-23 RX ADMIN — INSULIN HUMAN SCH: 100 INJECTION, SOLUTION PARENTERAL at 07:45

## 2018-10-23 RX ADMIN — Medication SCH MG: at 12:04

## 2018-10-23 RX ADMIN — INSULIN HUMAN SCH UNIT: 100 INJECTION, SOLUTION PARENTERAL at 12:55

## 2018-10-23 RX ADMIN — THERA TABS SCH TAB: TAB at 12:04

## 2018-10-23 RX ADMIN — MAGNESIUM HYDROXIDE PRN ML: 400 SUSPENSION ORAL at 13:53

## 2018-10-23 NOTE — PN
DATE:  10/23/2018



LOCATION:  The patient is in [facility] Behavioral Care Unit,

room 518, bed 1.



SUBJECTIVE:  The patient was admitted with depression.  The patient has

medical history of diabetes, chronic lung disease.  The patient has also

history of hypertension, alcoholism.  He is resting, sleeping in bed.  He

answers questions and apparently ECT has been scheduled to today.



PHYSICAL EXAMINATION:

VITAL SIGNS:  Pulse is 73, blood pressure 130/84, respirations are 20.

HEENT:  The patient's examination of the head is normocephalic.

NECK:  The thyroid is not enlarged.  JVP is flat.

LUNGS:  Clear.

HEART:  Normal sinus rhythm.  S1 and S2 present.

ABDOMEN:  Soft.  Obesity present.

CNS:  No focal deficits are noted.



LABORATORY DATA:  The patient's lab work was done on 10/16/2018.  The white

count is within normal range.  The patient's chemistry is okay.  We will

repeat his chemistry.  His blood sugar is 251, is slightly elevated and has

sugar runs closer 200.



ASSESSMENT AND PLAN:  The patient is scheduled for ECT today and medically

cleared.  There is no apparent contraindication at this time.  This

procedure will be done under anesthesia.  The patient's list of

medications:  He is on Advair twice a day for COPD.  The patient is on

Celexa, folic acid, metformin, glipizide, insulin coverage.  The patient is

on Protonix, the magnesium oxide, Seroquel, atenolol and the patient is on

DuoNeb inhaler, thiamine 100 mg and lisinopril 2.5 mg.  We will continue

current management and followup.





__________________________________________

Manuel Tam MD



DD:  10/23/2018 8:12:48

DT:  10/23/2018 8:24:57

Job # [id number]

## 2018-10-23 NOTE — PN
DATE:  10/23/2018



PULMONARY PROGRESS NOTE



REFERRING PHYSICIAN:  Dr. Raisa Rascon



SUBJECTIVE:  He is ambulating in the hallway, feels better, night was

unremarkable, tolerated CPAP well, receiving inhaled bronchodilator.  No

cough.  No sputum production.  No nausea, vomiting, diarrhea, leg pain or

leg swelling.



OBJECTIVE:

GENERAL:  In no acute distress.

VITAL SIGNS:  Temperature is 98, heart rate 76, respiratory rate is 18,

blood pressure 120/81.

HEENT:  Moist mucous membrane.  Crowded airway.  Mallampati score is 4.

NECK:  Supple.  No JVD.

LUNGS:  Have a fair airflow with rhonchi.

HEART:  S1 and S2.

ABDOMEN:  Soft, nontender.  No organomegaly.

EXTREMITIES:  There is no edema.

NEUROLOGIC:  Awake and alert, follows simple command.



MEDICATIONS:  He is on Advair 500/50 1 puff twice a day, Celexa 20 mg

daily, folic acid 1 mg daily, metformin 1000 mg twice a day, Glucotrol 10

mg daily, insulin coverage, Levemir 40 units subcu at bedtime, Maalox 30 mL

p.r.n., mag oxide 400 mg twice a day, milk of magnesia p.r.n., Protonix 40

mg daily, Seroquel 300 mg at bedtime, Tenormin 50 mg daily, multivitamins

daily, Tylenol p.r.n. basis, Ventolin HFA 2 puffs every 4 hours p.r.n.,

vitamin B1 100 mg daily, Zestril 2.5 mg daily.



LABORATORY DATA:  Shows blood sugar this morning 242.



IMPRESSION AND PLAN:  Schizoaffective disorder, chronic obstructive lung

disease, sleep apnea syndrome, morbid obesity.  Pulmonary point of view,

doing okay.  Continue inhaled bronchodilator.  Encourage CPAP use.  Keep

head at 45 degrees.  Careful with sedation.  Fall precaution.  Continue

therapy.  Thank you and we will follow with you.





__________________________________________

Zhane Joshi MD



DD:  10/23/2018 22:06:42

DT:  10/23/2018 22:08:19

Job # [id number]
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
## 2018-10-24 LAB
ALBUMIN SERPL-MCNC: 4.3 G/DL (ref 3–4.8)
ALBUMIN/GLOB SERPL: 1.6 {RATIO} (ref 1.1–1.8)
ALT SERPL-CCNC: 104 U/L (ref 7–56)
AST SERPL-CCNC: 53 U/L (ref 17–59)
BUN SERPL-MCNC: 16 MG/DL (ref 7–21)
CALCIUM SERPL-MCNC: 9.6 MG/DL (ref 8.4–10.5)
GFR NON-AFRICAN AMERICAN: > 60

## 2018-10-24 RX ADMIN — INSULIN HUMAN SCH: 100 INJECTION, SOLUTION PARENTERAL at 21:22

## 2018-10-24 RX ADMIN — PANTOPRAZOLE SODIUM SCH MG: 40 TABLET, DELAYED RELEASE ORAL at 08:25

## 2018-10-24 RX ADMIN — Medication SCH MG: at 08:25

## 2018-10-24 RX ADMIN — THERA TABS SCH TAB: TAB at 08:25

## 2018-10-24 RX ADMIN — INSULIN HUMAN SCH UNIT: 100 INJECTION, SOLUTION PARENTERAL at 12:16

## 2018-10-24 RX ADMIN — INSULIN DETEMIR SCH U: 100 INJECTION, SOLUTION SUBCUTANEOUS at 21:46

## 2018-10-24 RX ADMIN — INSULIN HUMAN SCH UNIT: 100 INJECTION, SOLUTION PARENTERAL at 08:05

## 2018-10-24 RX ADMIN — INSULIN HUMAN SCH UNIT: 100 INJECTION, SOLUTION PARENTERAL at 17:35

## 2018-10-24 RX ADMIN — Medication SCH MG: at 17:36

## 2018-10-24 RX ADMIN — FLUTICASONE PROPIONATE AND SALMETEROL SCH PUFF: 50; 500 POWDER RESPIRATORY (INHALATION) at 06:38

## 2018-10-24 RX ADMIN — FLUTICASONE PROPIONATE AND SALMETEROL SCH PUFF: 50; 500 POWDER RESPIRATORY (INHALATION) at 17:36
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
## 2018-10-24 NOTE — PN
DATE:  10/24/2018



LOCATION:  The patient is in [facility] in Behavioral Care

Unit.



SUBJECTIVE:  The patient was admitted with depression.  The patient has

history of diabetes, chronic lung disease.  The patient also has history of

hypertension, alcoholism.  He is seen this morning.  He had ECT treatment

yesterday.  He still does not feel good according to his judgment at this

time.



PHYSICAL EXAMINATION:

VITAL SIGNS:  Pulse is 75, blood pressure 105/62, respirations are 20, the

patient's temperature 97.8.

HEENT:  His head is normocephalic.

LUNGS:  Clear.

HEART:  Normal sinus rhythm.

ABDOMEN:  Soft.  Liver and spleen not palpable.

CNS:  No focal deficit.



MEDICATIONS:  The patient is on Advair twice a day, Celexa 20 mg daily,

folic acid 1 mg daily.  He is on metformin, glipizide, insulin coverage for

diabetes.  The patient gets magnesium oxide 400 mg b.i.d. for low magnesium

level, pantoprazole 40 mg daily, Seroquel 300 mg at night, atenolol 50 mg

daily and the patient is on heart-healthy diabetic diet.



LABORATORY DATA:  His blood work is repeated, but the results are not

posted.



ASSESSMENT AND PLAN:  His condition seemed to be pretty much at a

standstill at this time.  We will have to wait to see the results.  He

might need further ECT treatment.  We will follow up with the psychiatric

care.





__________________________________________

Manuel Tam MD





DD:  10/24/2018 7:54:05

DT:  10/24/2018 10:30:59

Job # [id number]

## 2018-10-24 NOTE — PN
DATE:  10/24/2018



PULMONARY PROGRESS NOTE



REFERRING PHYSICIAN:  Raisa Rascon MD.



SUBJECTIVE:  The patient is ambulating in the room.  Night was

unremarkable.  Complaining about insomnia, but did use CPAP.  No cough.  No

sputum production.  No nausea, vomiting, diarrhea, leg pain or leg

swelling.



OBJECTIVE:

GENERAL:  In no acute distress.

VITAL SIGNS:  Temperature is 98, heart rate is 75, respiratory rate is 20,

blood pressure 105/62.

HEENT:  Moist mucous membrane.  Crowded airway.

NECK:  Supple.  No JVD.

LUNGS:  Have a fair airflow with rhonchi.

HEART:  S1 and S2.

ABDOMEN:  Soft, nontender.  No organomegaly.

EXTREMITIES:  There is no edema.

NEUROLOGICAL:   Awake and alert.  Follows simple command.



MEDICATIONS:  He is on Advair 500/50 one puff twice a day, Celexa 20 mg

daily, folic acid 1 mg daily, metformin 1000 mg twice a day, glipizide 10

mg daily, insulin coverage, Levemir 40 units subcu at bedtime, Maalox

p.r.n. basis, mag oxide _____ mg twice a day, milk of magnesia p.r.n.

basis, Protonix 40 mg daily, Seroquel 300 mg at bedtime, atenolol 50 mg

daily, multivitamins daily, vitamin B1 at 100 mg daily, lisinopril 2.5 mg

daily.



LABORATORY DATA:  Shows sodium is 139, potassium 2.9, chloride 99,

bicarbonate 31, BUN 16, creatinine

1, glucose is 161, calcium 9.6, magnesium not available, AST 53, ,

alk phos is 63, albumin is 2.7.



IMPRESSION AND PLAN:  Schizoaffective disorder, chronic obstructive lung

disease, sleep apnea syndrome, morbid obesity.  Pulmonary point of view,

doing okay.  Continue bronchodilator.  Keep head at 45 degrees.  Encourage

CPAP use.  Fall precaution.  Gastric prophylaxis.  Continue therapy.



Thank you and we will follow with you.





__________________________________________

Zhane Joshi MD





DD:  10/24/2018 15:27:37

DT:  10/24/2018 15:57:26

Job # [id number]

## 2018-10-24 NOTE — PCM.BM
<Jagruti Crespo - Last Filed: 10/24/18 11:59>





Treatment Plan Problems





- Problems identified on initial assessmt


  ** INEFFECTIVE INDV COPING


Date Initiated: 10/16/18 (10/24 remain feeling depressed and getting ect 2x/wk)


Time Initiated: 19:00


Assessment reference: NA


Status: Active


Priority: 1





  ** HOPELESS/HELPLESS


Date Initiated: 10/16/18 (10/24 reman feeling helpless)


Time Initiated: 19:00


Assessment reference: NA


Status: Active


Priority: 2





  ** FEELING WORTHLESS


Date Initiated: 10/16/18 (10/24 remain feeling worthless)


Time Initiated: 19:00


Assessment reference: NA


Status: Active


Priority: 3





  ** ALTERATION IN SLEEP


Date Initiated: 10/16/18 (10/24 remain with poor sleep)


Time Initiated: 19:00


Assessment reference: NA


Status: Active


Priority: 4





  ** KNOWLEDGE DEFICIT ALCOHOL USE


Date Initiated: 10/16/18


Time Initiated: 19:00


Assessment reference: NA


Status: Active


Priority: 5





Treatment assets and liabiliti


Patient Assests: cooperative, ADL independent, good support system, cognitively 

intact


Patient Liabilities: substance abuse





- Milieu Protocol


Maintain good personal hygiene: every shift Encourage regular showers, every 

shift Remind patient to perform daily oral care, every shift Assist patient to 

perform ADL's


Maintain personal safety: every other day Educate patient to report safety 

concerns to staff, every other day Monitor environment for contraband/sharps


Medication safety: Monitor for expected outcome, potential side effects: every 

other day, Assess barriers to learning: every other day, Assess readiness for 

medication education: every other day


Milieu Narrative: 


Milieu/structure/supportive therapy 


Medical consult appreciated, see medical team note for more detailed info 


SW consultation for discharge plan and social issues 


Med management 


seroquel 300mg po hs for mood stabilization and augment for MDD


ativan 1mg po bid d/c


Celexa 20 mg daily for depression and anxiety


#1 ECT treatment 10/23/18


MVI/thiamine/folic acid


Family involvement 


Follow up on labs 


Will monitor closely 


Pt was educated about risk/benefits and alternatives of medications, coping 

strategies (safety plan, suicide prevention), relapse prevention, importance of 

follow up with psychiatrist and therapist, stay away from drugs/alcohol/smoking








Family Contact


Family involvement: Family/SO is involved


Family contact: Patient agrees to contact


Family contact name: Emily Charlton(wife)


Family contacted how many times per week?: 2





- Goals for Treatment


Patient goals for treatment: "To get better."





Discharge/Continuing Care





- Education Needs


Education Needs: Patient Medication, Patient Diagnosis/Disease Process, Patient 

Coping Skills, Patient Community resources, Patient Activities of Daily Living, 

Patient Nutrition, Patient Uses of Medical Equipment, Patient Health 

Practices/Safety, Patient Personal Hygiene/Grooming, Patient Aftercare Safety P

erum





- Discharge


Discharge Criteria: Tolerates medication w/o severe side effects, Free of 

agitation, Normal sleep pattern, Ability to care for self, No longer exhibiting 

s/s of withdrawal, Reduction of target symptoms


Discharge to:: Home





- Treatment Team Participation


Patient/Family/SO Statement: 


Milieu/structure/supportive therapy 


Medical consult appreciated, see medical team note for more detailed info 


SW consultation for discharge plan and social issues 


Med management 


seroquel 300mg po hs for mood stabilization and augment for MDD


ativan 1mg po bid d/c


Celexa 20 mg daily for depression and anxiety


#1 ECT treatment 10/23/18


MVI/thiamine/folic acid


Family involvement 


Follow up on labs 


Will monitor closely 


Pt was educated about risk/benefits and alternatives of medications, coping 

strategies (safety plan, suicide prevention), relapse prevention, importance of 

follow up with psychiatrist and therapist, stay away from drugs/alcohol/smoking








Treatment Plan Review





- Problem


  ** INEFFECTIVE INDV COPING


Time Initiated: 19:00





  ** HOPELESS/HELPLESS


Time Initiated: 19:00





  ** FEELING WORTHLESS


Time Initiated: 19:00





  ** ALTERATION IN SLEEP


Time Initiated: 19:00





  ** KNOWLEDGE DEFICIT ALCOHOL USE


Time Initiated: 19:00





<Raisa Rascon A - Last Filed: 10/24/18 15:59>





- Diagnosis


(1) Major depression


Status: Acute   


Interventions: 





10/24/18 15:59


"I still feel the same, I am very depressed"

## 2018-10-25 PROCEDURE — GZB4ZZZ OTHER ELECTROCONVULSIVE THERAPY: ICD-10-PCS | Performed by: PSYCHIATRY & NEUROLOGY

## 2018-10-25 RX ADMIN — INSULIN HUMAN SCH UNIT: 100 INJECTION, SOLUTION PARENTERAL at 18:37

## 2018-10-25 RX ADMIN — THERA TABS SCH TAB: TAB at 12:55

## 2018-10-25 RX ADMIN — PANTOPRAZOLE SODIUM SCH MG: 40 TABLET, DELAYED RELEASE ORAL at 12:56

## 2018-10-25 RX ADMIN — INSULIN HUMAN SCH: 100 INJECTION, SOLUTION PARENTERAL at 21:39

## 2018-10-25 RX ADMIN — INSULIN HUMAN SCH: 100 INJECTION, SOLUTION PARENTERAL at 12:58

## 2018-10-25 RX ADMIN — Medication SCH MG: at 12:55

## 2018-10-25 RX ADMIN — FLUTICASONE PROPIONATE AND SALMETEROL SCH PUFF: 50; 500 POWDER RESPIRATORY (INHALATION) at 06:40

## 2018-10-25 RX ADMIN — INSULIN HUMAN SCH UNIT: 100 INJECTION, SOLUTION PARENTERAL at 12:58

## 2018-10-25 RX ADMIN — FLUTICASONE PROPIONATE AND SALMETEROL SCH PUFF: 50; 500 POWDER RESPIRATORY (INHALATION) at 18:37

## 2018-10-25 RX ADMIN — Medication SCH MG: at 16:53

## 2018-10-25 RX ADMIN — INSULIN DETEMIR SCH U: 100 INJECTION, SOLUTION SUBCUTANEOUS at 21:41

## 2018-10-25 NOTE — CP.PCM.PN
Subjective





- Date & Time of Evaluation


Date of Evaluation: 10/25/18


Time of Evaluation: 07:30





- Subjective


Subjective: 





Patient seen this morning.  He is to have another ECT treatment today.  





Objective





- Vital Signs/Intake and Output


Vital Signs (last 24 hours): 


                                        











Temp Pulse Resp BP Pulse Ox


 


 97.0 F L  76   17   126/87   98 


 


 10/25/18 12:53  10/25/18 12:56  10/25/18 11:40  10/25/18 12:56  10/25/18 11:40








Intake and Output: 


                                        











 10/25/18 10/25/18





 06:59 18:59


 


Intake Total  0


 


Balance  0














- Medications


Medications: 


                               Current Medications





Acetaminophen (Tylenol 325mg Tab)  650 mg PO Q6H PRN


   PRN Reason: Pain, severe (8-10)


   Last Admin: 10/25/18 12:53 Dose:  650 mg


Al Hydrox/Mg Hydrox/Simethicone (Maalox Plus 30 Ml)  30 ml PO DAILY PRN


   PRN Reason: Indigestion / Heartburn


Albuterol (Ventolin Hfa 90 Mcg/Actuation (8 G))  2 puff IH [id number] PRN


   PRN Reason: Shortness of Breath


Atenolol (Tenormin)  50 mg PO DAILY [facility]


   Last Admin: 10/25/18 12:55 Dose:  50 mg


Citalopram Hydrobromide (Celexa)  20 mg PO DAILY [facility]


   Last Admin: 10/25/18 12:56 Dose:  20 mg


Folic Acid (Folic Acid)  1 mg PO DAILY [facility]


   Last Admin: 10/25/18 12:55 Dose:  1 mg


Glipizide (Glucotrol)  10 mg PO DAILY [facility]


   Last Admin: 10/25/18 12:54 Dose:  10 mg


Sodium Chloride (Sodium Chloride 0.9%)  1,000 mls @ 100 mls/hr IV .Q10H [facility]


Insulin Detemir (Levemir)  40 unit SC HS [facility]


   Last Admin: 10/24/18 21:46 Dose:  40 u


Insulin Human Regular (Humulin R Low)  0 units SC ACHS [facility]; Protocol


   Last Admin: 10/25/18 12:58 Dose:  1 unit


Lisinopril (Zestril)  2.5 mg PO DAILY [facility]


   Last Admin: 10/25/18 12:56 Dose:  2.5 mg


Magnesium Hydroxide (Milk Of Magnesia)  30 ml PO DAILY PRN


   PRN Reason: Constipation


   Last Admin: 10/23/18 13:53 Dose:  30 ml


Magnesium Oxide (Mag-Ox)  400 mg PO BID [facility]


   Last Admin: 10/25/18 12:55 Dose:  400 mg


Metformin HCl (Glucophage)  1,000 mg PO BID [facility]


   Last Admin: 10/25/18 12:54 Dose:  1,000 mg


Multivitamins (Thera Tab)  1 tab PO 0800 [facility]


   Last Admin: 10/25/18 12:55 Dose:  1 tab


Pantoprazole Sodium (Protonix Ec Tab)  40 mg PO DAILY [facility]


   Last Admin: 10/25/18 12:56 Dose:  40 mg


Quetiapine Fumarate (Seroquel)  300 mg PO HS [facility]; Protocol


   Last Admin: 10/24/18 21:47 Dose:  300 mg


Fluticasone/Salmeterol (Advair Diskus 500/50)  1 puff INH Q12 [facility]


   Last Admin: 10/25/18 06:40 Dose:  1 puff


Thiamine HCl (Vitamin B1 Tab)  100 mg PO DAILY [facility]


   Last Admin: 10/25/18 12:57 Dose:  100 mg











- Labs


Labs: 


                                        





                                 10/16/18 09:30 





                                 10/24/18 07:20 











- Constitutional


Appears: No Acute Distress





- Head Exam


Head Exam: ATRAUMATIC, NORMOCEPHALIC





- Respiratory Exam


Respiratory Exam: Clear to Ausculation Bilateral, NORMAL BREATHING PATTERN





- Cardiovascular Exam


Cardiovascular Exam: REGULAR RHYTHM, +S1, +S2





- GI/Abdominal Exam


GI & Abdominal Exam: Soft, Normal Bowel Sounds.  absent: Tenderness





- Extremities Exam


Extremities Exam: Normal Inspection





- Neurological Exam


Neurological Exam: Alert, Awake, Oriented x3





Assessment and Plan





- Assessment and Plan (Free Text)


Assessment: 





HTN


DMII


history of alcholism





Plan: 





continue medications


blood sugar is reasonably controlled


blood pressure is controlled


behavioral treatment as per psychiatry

## 2018-10-25 NOTE — PN
DATE:  10/25/2018



PULMONARY PROGRESS NOTE



REFERRING PHYSICIAN:  Raisa Rascon MD.



SUBJECTIVE:  He is sitting up in a chair.  Wife is at bedside.  Night was

unremarkable.  Has ECT therapy.  Complaining of insomnia.  No cough.  No

sputum production.  No nausea, vomiting, diarrhea, leg pain, leg swelling.



OBJECTIVE:

GENERAL:  In no acute distress.

VITAL SIGNS:  Temperature is 98, heart rate is 68, respiratory rate is 20,

blood pressure 118/78.

HEENT:  Moist mucous membrane.  Crowded airway.

NECK:  Supple.  No JVD.

LUNGS:  Have a fair airflow with few rhonchi.

HEART:  S1 and S2.

ABDOMEN:  Soft and nontender.  No organomegaly.

EXTREMITIES:  No edema.

NEUROLOGICAL:   Awake and alert.  Follows simple command.



MEDICATIONS:  He is on Advair 500/50 one puff twice a day, Celexa 20 mg

daily, folic acid 1 mg daily, metformin 1000 mg twice a day, glipizide 10

mg daily, insulin coverage, Levemir 40 units subcu at bedtime, milk of

magnesia 30 mL p.r.n. basis, mag oxide 400 mg twice a day, Protonix 40 mg

daily, Seroquel 300 mg at bedtime, IV fluid normal saline 100 mL/hour,

Sonata 5 mg at bedtime, Tylenol 50 mg daily, multivitamins daily, Ventolin

2 puffs every 6 hours p.r.n., thiamine 100 mg daily, Zestril 2.5 mg daily.



LABORATORY DATA:  Reviewed.  Blood sugar today is 123.



IMPRESSION AND PLAN:  Schizoaffective disorder, chronic obstructive lung

disease, sleep apnea syndrome, morbid obesity.  Pulmonary point of view,

doing okay.  Spoke to the wife at bedside.  Continue p.o. and inhaled

bronchodilator.  Encourage CPAP use at night.  Careful with sedation. 

Gastric prophylaxis.  Fall precaution.  Continue therapy.



Thank you and we will follow with you.





__________________________________________

Zhane Joshi MD





DD:  10/25/2018 22:35:43

DT:  10/25/2018 22:40:25

Job # [id number]
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
## 2018-10-26 RX ADMIN — Medication SCH MG: at 08:41

## 2018-10-26 RX ADMIN — THERA TABS SCH TAB: TAB at 08:41

## 2018-10-26 RX ADMIN — Medication SCH MG: at 17:04

## 2018-10-26 RX ADMIN — INSULIN HUMAN SCH: 100 INJECTION, SOLUTION PARENTERAL at 08:42

## 2018-10-26 RX ADMIN — INSULIN HUMAN SCH UNIT: 100 INJECTION, SOLUTION PARENTERAL at 16:52

## 2018-10-26 RX ADMIN — PANTOPRAZOLE SODIUM SCH MG: 40 TABLET, DELAYED RELEASE ORAL at 08:42

## 2018-10-26 RX ADMIN — FLUTICASONE PROPIONATE AND SALMETEROL SCH PUFF: 50; 500 POWDER RESPIRATORY (INHALATION) at 08:41

## 2018-10-26 RX ADMIN — FLUTICASONE PROPIONATE AND SALMETEROL SCH PUFF: 50; 500 POWDER RESPIRATORY (INHALATION) at 17:09

## 2018-10-26 RX ADMIN — INSULIN HUMAN SCH UNIT: 100 INJECTION, SOLUTION PARENTERAL at 16:58

## 2018-10-26 RX ADMIN — INSULIN DETEMIR SCH U: 100 INJECTION, SOLUTION SUBCUTANEOUS at 21:54

## 2018-10-26 RX ADMIN — INSULIN HUMAN SCH: 100 INJECTION, SOLUTION PARENTERAL at 22:00

## 2018-10-26 NOTE — PCM.BM
<Raisa Rascon - Last Filed: 10/26/18 15:55>





- Diagnosis


(1) Major depression


Status: Acute   


Interventions: 





10/26/18 15:55


pt reported no improvement with his symptoms


pt reported that he has difficulties to fall and to stay asleep


denied any intent or plan to kill self, but passive wish to be dead











<Deb Islas - Last Filed: 10/31/18 15:22>





Treatment Plan Problems





- Problems identified on initial assessmt


  ** ALTERATION IN SLEEP


Date Initiated: 10/16/18 (10/24 remain with poor sleep)


Time Initiated: 19:00


Assessment reference: NA


Status: Active


Priority: 4





  ** FEELING WORTHLESS


Date Initiated: 10/16/18 (10/24 remain feeling worthless)


Time Initiated: 19:00


Assessment reference: NA


Status: Active


Priority: 3





  ** HOPELESS/HELPLESS


Date Initiated: 10/16/18 (10/24 reman feeling helpless)


Time Initiated: 19:00


Assessment reference: NA


Status: Active


Priority: 2





  ** INEFFECTIVE INDV COPING


Date Initiated: 10/16/18 (10/24 remain feeling depressed and getting ect 2x/wk)


Time Initiated: 19:00


Assessment reference: NA


Status: Active


Priority: 1





  ** KNOWLEDGE DEFICIT ALCOHOL USE


Date Initiated: 10/16/18


Time Initiated: 19:00


Assessment reference: NA


Status: Active


Priority: 5





Treatment assets and liabiliti


Patient Assests: cooperative, ADL independent, good support system, cognitively 

intact


Patient Liabilities: substance abuse





- Milieu Protocol


Maintain good personal hygiene: every shift Encourage regular showers, every 

shift Remind patient to perform daily oral care, every shift Assist patient to 

perform ADL's


Maintain personal safety: every other day Educate patient to report safety 

concerns to staff, every other day Monitor environment for contraband/sharps


Medication safety: Monitor for expected outcome, potential side effects: every 

other day, Assess barriers to learning: every other day, Assess readiness for 

medication education: every other day


Milieu Narrative: 





* c/w current treatment and plan


* No new weekend lab results thus far


* Vitals reviewed and noted below:





Family Contact


Family contact: Patient agrees to contact


Family contact name: Emily Charlton(wife)


Family contacted how many times per week?: 2





- Goals for Treatment


Patient goals for treatment: "To get better."





Discharge/Continuing Care





- Education Needs


Education Needs: Patient Medication, Patient Diagnosis/Disease Process, Patient 

Coping Skills, Patient Community resources, Patient Activities of Daily Living, 

Patient Nutrition, Patient Uses of Medical Equipment, Patient Health 

Practices/Safety, Patient Personal Hygiene/Grooming, Patient Aftercare Safety 

Plan





- Discharge


Discharge Criteria: Tolerates medication w/o severe side effects, Free of 

agitation, Normal sleep pattern, Ability to care for self, No longer exhibiting 

s/s of withdrawal, Reduction of target symptoms


Discharge to:: Home





- Treatment Team Participation


Patient/Family/SO Statement: 





* c/w current treatment and plan


* No new weekend lab results thus far


* Vitals reviewed and noted below:





Treatment Plan Review





- Problem


  ** ALTERATION IN SLEEP


Time Initiated: 19:00





  ** FEELING WORTHLESS


Time Initiated: 19:00





  ** HOPELESS/HELPLESS


Time Initiated: 19:00





  ** INEFFECTIVE INDV COPING


Time Initiated: 19:00





  ** KNOWLEDGE DEFICIT ALCOHOL USE


Time Initiated: 19:00
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
## 2018-10-26 NOTE — PN
DATE:  10/26/2018



SUBJECTIVE:  The patient is in [facility] behavioral care

unit, room 518, bed 1.  He was admitted with depression.  The patient had

past history of diabetes, chronic lung disease.  The patient has history of

alcoholism.  It seemed this morning he had ECG treatment yesterday.  He

says he does not feel that much different.



PHYSICAL EXAMINATION:

VITAL SIGNS:  Pulse is 63, blood pressure 110/60, respirations 20, O2 sat

is 93% on room air, and the patient's temperature is 97.3.

HEENT:  The patient's head is normocephalic.

LUNGS:  Clear.

HEART:  Normal sinus rhythm.

ABDOMEN:  Soft.  Liver and spleen not palpable.

CENTRAL NERVOUS SYSTEM:  No focal deficit.  The patient answers all

questions.



LABORATORY DATA:  His blood work done is stable.



MEDICATIONS:  The patient is on Advair twice a day, the patient is on

Celexa, folic acid, metformin, glipizide, insulin coverage.  The patient is

also getting thiamine, magnesium oxide, pantoprazole, Seroquel.  The

patient is on atenolol 50 mg too.



ASSESSMENT AND PLAN:  Diet is heart healthy diet.  Diabetic.  We will

continue current management.  Follow up with behavioral care unit

physician.







__________________________________________

Manuel Tam MD



DD:  10/26/2018 8:33:13

DT:  10/26/2018 11:31:30

Job # [id number]

## 2018-10-26 NOTE — PN
DATE:  10/26/2018



PULMONARY PROGRESS NOTE



REFERRING PHYSICIAN:  Raisa Rascon MD.



SUBJECTIVE:  Sitting, watching TV.  Night was unremarkable, still

complaining about insomnia, but using CPAP and inhaled bronchodilator.  No

nausea, vomiting, or diarrhea.  No leg pain or leg swelling.



OBJECTIVE:

GENERAL:  In no acute distress.

VITAL SIGNS:  Temperature is 98, heart rate 63, respiratory rate is 20,

blood pressure 106/63.

HEENT:  Moist mucous membrane.  Crowded airway. Mallampati score is 4.

NECK:  Supple.  No JVD.

LUNGS:  Fair airflow with rhonchi.

HEART:  S1 and S2.

ABDOMEN:  Soft and nontender.  No organomegaly.

EXTREMITIES:  No edema.

NEUROLOGICAL:  Awake and alert.  Follows simple command.



MEDICATIONS:  He is on Advair 500/50 one puff twice a day, Celexa 20 mg

daily, folic acid 1 mg daily, metformin 1000 mg twice a day, [id number] mg

daily, insulin coverage, Levemir 40 units subcu at bedtime, Maalox daily

p.r.n., mag oxide 400 mg twice a day, Protonix 40 mg daily, Seroquel 300 mg

at bedtime, IV fluid normal saline 100 mL per hour was given yesterday,

Sonata 5 mg at bedtime, Tenormin 50 mg daily, multivitamins daily, Tylenol

p.r.n. basis, Ventolin 2 puffs every 6 hours p.r.n., vitamin B1 100 mg

daily, Zestril 2.5 mg daily.



LABORATORY DATA:  Reviewed.  Blood sugar this morning is 137.



IMPRESSION AND PLAN:  Schizoaffective disorder, chronic obstructive lung

disease, sleep apnea syndrome, morbid obesity.  Pulmonary point of view,

doing okay.  Continue inhaled bronchodilator and current CPAP use.  Careful

with sedation.  Fall precaution.



Thank you and we will follow with you.





__________________________________________

Zhane Joshi MD



DD:  10/26/2018 14:14:23

DT:  10/26/2018 20:23:13

Job # [id number]

## 2018-10-27 RX ADMIN — PANTOPRAZOLE SODIUM SCH MG: 40 TABLET, DELAYED RELEASE ORAL at 08:41

## 2018-10-27 RX ADMIN — INSULIN HUMAN SCH: 100 INJECTION, SOLUTION PARENTERAL at 21:21

## 2018-10-27 RX ADMIN — Medication SCH MG: at 08:41

## 2018-10-27 RX ADMIN — INSULIN HUMAN SCH UNIT: 100 INJECTION, SOLUTION PARENTERAL at 16:54

## 2018-10-27 RX ADMIN — FLUTICASONE PROPIONATE AND SALMETEROL SCH PUFF: 50; 500 POWDER RESPIRATORY (INHALATION) at 17:05

## 2018-10-27 RX ADMIN — Medication SCH MG: at 16:39

## 2018-10-27 RX ADMIN — THERA TABS SCH TAB: TAB at 08:40

## 2018-10-27 RX ADMIN — FLUTICASONE PROPIONATE AND SALMETEROL SCH PUFF: 50; 500 POWDER RESPIRATORY (INHALATION) at 06:07

## 2018-10-27 RX ADMIN — INSULIN HUMAN SCH: 100 INJECTION, SOLUTION PARENTERAL at 08:58

## 2018-10-27 RX ADMIN — INSULIN HUMAN SCH UNIT: 100 INJECTION, SOLUTION PARENTERAL at 12:02

## 2018-10-27 RX ADMIN — INSULIN DETEMIR SCH U: 100 INJECTION, SOLUTION SUBCUTANEOUS at 21:22

## 2018-10-27 RX ADMIN — MAGNESIUM HYDROXIDE PRN ML: 400 SUSPENSION ORAL at 15:02

## 2018-10-27 NOTE — CP.PCM.PN
Subjective





- Date & Time of Evaluation


Date of Evaluation: 10/27/18


Time of Evaluation: 07:45





- Subjective


Subjective: 





Patient is in the behavioral care unit.  He is admitted with depression.





Objective





- Vital Signs/Intake and Output


Vital Signs (last 24 hours): 


                                        











Temp Pulse Resp BP Pulse Ox


 


 98.1 F   71   20   106/64   98 


 


 10/27/18 07:18  10/27/18 07:18  10/27/18 07:18  10/27/18 07:18  10/25/18 11:40











- Medications


Medications: 


                               Current Medications





Acetaminophen (Tylenol 325mg Tab)  650 mg PO Q6H PRN


   PRN Reason: Pain, severe (8-10)


   Last Admin: 10/25/18 12:53 Dose:  650 mg


Al Hydrox/Mg Hydrox/Simethicone (Maalox Plus 30 Ml)  30 ml PO DAILY PRN


   PRN Reason: Indigestion / Heartburn


Albuterol (Ventolin Hfa 90 Mcg/Actuation (8 G))  2 puff IH [id number] PRN


   PRN Reason: Shortness of Breath


Atenolol (Tenormin)  50 mg PO DAILY [facility]


   Last Admin: 10/27/18 08:40 Dose:  50 mg


Citalopram Hydrobromide (Celexa)  20 mg PO DAILY [facility]


   Last Admin: 10/27/18 08:40 Dose:  20 mg


Folic Acid (Folic Acid)  1 mg PO DAILY [facility]


   Last Admin: 10/27/18 08:40 Dose:  1 mg


Gabapentin (Neurontin)  300 mg PO TID [facility]; Protocol


   Last Admin: 10/27/18 17:04 Dose:  300 mg


Glipizide (Glucotrol)  10 mg PO DAILY [facility]


   Last Admin: 10/27/18 08:40 Dose:  10 mg


Sodium Chloride (Sodium Chloride 0.9%)  1,000 mls @ 100 mls/hr IV .Q10H [facility]


Insulin Detemir (Levemir)  40 unit SC HS [facility]


   Last Admin: 10/26/18 21:54 Dose:  40 u


Insulin Human Regular (Humulin R Low)  0 units SC ACHS [facility]; Protocol


   Last Admin: 10/27/18 16:54 Dose:  1 unit


Lisinopril (Zestril)  2.5 mg PO DAILY [facility]


   Last Admin: 10/27/18 08:41 Dose:  2.5 mg


Magnesium Hydroxide (Milk Of Magnesia)  30 ml PO DAILY PRN


   PRN Reason: Constipation


   Last Admin: 10/27/18 15:02 Dose:  30 ml


Magnesium Oxide (Mag-Ox)  400 mg PO BID [facility]


   Last Admin: 10/27/18 16:39 Dose:  400 mg


Metformin HCl (Glucophage)  1,000 mg PO BID [facility]


   Last Admin: 10/27/18 16:39 Dose:  1,000 mg


Multivitamins (Thera Tab)  1 tab PO 0800 [facility]


   Last Admin: 10/27/18 08:40 Dose:  1 tab


Pantoprazole Sodium (Protonix Ec Tab)  40 mg PO DAILY [facility]


   Last Admin: 10/27/18 08:41 Dose:  40 mg


Quetiapine Fumarate (Seroquel)  300 mg PO HS [facility]; Protocol


   Last Admin: 10/26/18 21:56 Dose:  300 mg


Fluticasone/Salmeterol (Advair Diskus 500/50)  1 puff INH Q12 [facility]


   Last Admin: 10/27/18 17:05 Dose:  1 puff


Thiamine HCl (Vitamin B1 Tab)  100 mg PO DAILY [facility]


   Last Admin: 10/27/18 08:41 Dose:  100 mg


Zaleplon (Sonata)  10 mg PO HS [facility]


   Last Admin: 10/26/18 21:30 Dose:  10 mg











- Labs


Labs: 


                                        





                                 10/16/18 09:30 





                                 10/24/18 07:20 











- Constitutional


Appears: No Acute Distress





- Head Exam


Head Exam: ATRAUMATIC, NORMOCEPHALIC





- Respiratory Exam


Respiratory Exam: Clear to Ausculation Bilateral, NORMAL BREATHING PATTERN





- Cardiovascular Exam


Cardiovascular Exam: REGULAR RHYTHM, +S1, +S2





- GI/Abdominal Exam


GI & Abdominal Exam: Soft, Normal Bowel Sounds.  absent: Tenderness





Assessment and Plan





- Assessment and Plan (Free Text)


Assessment: 





DMII


History of alcohol abuse


GERD


Hypertension


Depression


Plan: 





Patient has received 2 ECT treatments with the psychiatrist.  continue 

behavioral therapy


as per psychiatry.  continue medications for diabetes, hypertension and reflux. 

Blood pressure


is well controlled.  Blood sugar is reasonably controlled.

## 2018-10-27 NOTE — PCM.PYCHPN
Psychiatric Progress Note





- Psychiatric Progress Note


Patient seen today, length of contact: 30 minutes


Problems Identified/Issues Discussed: 


I reviewed  recent notes and patient was interviewed at bedside this morning. He

is familiar to me from prior interviews last weekend. Patient remains oriented 

x3 and superficially cooperative with interview questions. Seems more alert and 

focused today though he reports continued depression, fatigue and 

anxiety--generally unchanged since admission. Affect remains constricted without

evidence of perceptual disturbance. 


Patient is s/p two ECT treatments and remains agreeable to continue this 

treatment modality. Denies any side effects or new discomfort or pain. 


Staff note that patient seems to be improving in reactivity and range on unit. 

He is more visible on the unit. He attends and participates in groups. There 

were no behavioral issues over the weekend thus far.  


 


Diagnostic Results: 





as per h/o: schizoaffective, bipolar type


r/o substance induced mood disorder


h/o alcohol abuse/dependence





Medication Change: No ( )


Medical Record Reviewed: Yes





Mental Status Examination





- Cognitive Function


Orientation: Person, Place, Situation


Memory: Intact


Attention: WNL


Concentration: Poor


Association: Loose


Fund of Knowledge: Poor





- Mood


Mood: Depressed, Anxious





- Affect


Affect: Constricted





- Speech


Speech: Appropriate





- Formal Thought Process


Formal Thought Process: No Impairment





- Suicidal Ideation


Suicidal Ideation: No





- Homicidal Ideation


Homicidal Ideation: No





Goal/Treatment Plan





- Goal/Treatment Plan


Need for Continued Stay: Remain at risks for inpatient hospitalization, Severe 

depression anxiety, Discharge may exacerbated symptoms, Failed transitioning, 

Severe functional impairment


Progress Toward Problem(s) and Goals/Treatment Plan: 





* c/w current treatment and plan~ patient is s/p two ECT and tolerating well.   


* No new weekend lab results thus far


* Vitals reviewed and noted below:


                                                                Selected Entries











  10/26/18 10/26/18





  07:16 16:42


 


Temperature 97.3 F L 


 


Pulse Rate 63 68


 


Respiratory 20 





Rate  


 


Blood Pressure 106/63 117/78








Estimated Date of D/C: 11/02/18

## 2018-10-27 NOTE — PN
DATE:  10/27/2018SUBJECTIVE:  He is out of bed to chair, having lunch.

Wife is at bedside.  Night was unremarkable, complaining about

insomnia.  No cough, no sputum production.  No nausea, vomiting, or

diarrhea.  No leg pain, no leg swelling.



OBJECTIVE:

GENERAL:  In no acute distress.

VITAL SIGNS:  Temperature 98, heart rate 71, respiratory rate is 20,

blood pressure 106/64.

HEENT:  Moist mucous membrane.  Crowded airway. Mallampati score is 4.

NECK:  Supple.  No JVD.

LUNGS:  Fair airflow with few rhonchi.

HEART:  S1 and S2.

ABDOMEN:  Soft and nontender.  No organomegaly.

EXTREMITIES:  No edema.

NEUROLOGICAL:  Awake and alert.  Follows simple commands.



MEDICATIONS:  He is on Advair 500/50 one puff twice a day, also on

Celexa 20 mg daily, folic acid 1 mg daily, metformin 1000 mg twice a

day, glipizide 10 mg daily, insulin coverage, Levemir 40 units subcu

at bedtime,  mag oxide 400 mg twice a day, milk of magnesia 30 mL p.o.

daily p.r.n., Neurontin 300 mg 3 times a day, Protonix 40 mg daily,

Seroquel 300 mg at bedtime, Sonata 10 mg at bedtime, Tenormin 50 mg

daily, multivitamins daily, Tylenol p.r.n., vitamin B1 100 mg daily,

Zestril 2.5 mg daily.



LABORATORY DATA:  Reviewed.  Blood sugar yesterday was 173.



IMPRESSION AND PLAN:  Schizoaffective disorder, chronic obstructive

lung disease, sleep apnea syndrome, morbid obesity, insomnia.  Spoke

to wife at bedside.  All the questions answered.  Continue sedative. 

Encourage CPAP use.  Keep head at 45 degree.  Avoid smoking.  Fall

precaution.



Thank you and we will follow with you.





__________________________________________

Zhane Joshi MD





DD:  10/27/2018 17:50:13

DT:  10/27/2018 20:54:24

Job # [id number]

## 2018-10-28 RX ADMIN — INSULIN HUMAN SCH: 100 INJECTION, SOLUTION PARENTERAL at 22:39

## 2018-10-28 RX ADMIN — INSULIN HUMAN SCH UNIT: 100 INJECTION, SOLUTION PARENTERAL at 08:27

## 2018-10-28 RX ADMIN — FLUTICASONE PROPIONATE AND SALMETEROL SCH PUFF: 50; 500 POWDER RESPIRATORY (INHALATION) at 17:57

## 2018-10-28 RX ADMIN — INSULIN DETEMIR SCH U: 100 INJECTION, SOLUTION SUBCUTANEOUS at 21:34

## 2018-10-28 RX ADMIN — THERA TABS SCH TAB: TAB at 08:34

## 2018-10-28 RX ADMIN — PANTOPRAZOLE SODIUM SCH MG: 40 TABLET, DELAYED RELEASE ORAL at 08:34

## 2018-10-28 RX ADMIN — Medication SCH MG: at 08:34

## 2018-10-28 RX ADMIN — Medication SCH MG: at 17:37

## 2018-10-28 RX ADMIN — INSULIN HUMAN SCH: 100 INJECTION, SOLUTION PARENTERAL at 18:03

## 2018-10-28 RX ADMIN — INSULIN HUMAN SCH UNIT: 100 INJECTION, SOLUTION PARENTERAL at 12:18

## 2018-10-28 RX ADMIN — FLUTICASONE PROPIONATE AND SALMETEROL SCH PUFF: 50; 500 POWDER RESPIRATORY (INHALATION) at 07:25

## 2018-10-28 NOTE — PCM.PYCHPN
Psychiatric Progress Note





- Psychiatric Progress Note


Patient seen today, length of contact: 30 minutes


Problems Identified/Issues Discussed: 


I reviewed  recent notes and patient was interviewed at bedside this morning. He

is familiar to me from prior interviews last weekend. Patient remains oriented 

x3 and superficially cooperative with interview questions. Seems more alert, 

focused and engaged this weekend though he reports continued depression, fatigue

and anxiety--generally unchanged since admission. Affect remains constricted 

without evidence of perceptual disturbance. 


Patient is s/p two ECT treatments and remains agreeable to continue this 

treatment modality. Denies any side effects or new discomfort or pain. Utilizing

CPAP overnight but still feels sleep quality is restless and poor. 


Staff note that patient seems to be improving in reactivity and range on unit. 

He is more visible on the unit. He attends and participates in groups. There 

were no behavioral issues over the weekend thus far.  


 


Diagnostic Results: 





as per h/o: schizoaffective, bipolar type


r/o substance induced mood disorder


h/o alcohol abuse/dependence





Medication Change: No ( )


Medical Record Reviewed: Yes





Mental Status Examination





- Cognitive Function


Orientation: Person, Place, Situation


Memory: Intact


Attention: WNL


Concentration: Poor


Association: Loose


Fund of Knowledge: Poor





- Mood


Mood: Depressed, Anxious





- Affect


Affect: Constricted





- Speech


Speech: Appropriate





- Formal Thought Process


Formal Thought Process: No Impairment





- Suicidal Ideation


Suicidal Ideation: No





- Homicidal Ideation


Homicidal Ideation: No





Goal/Treatment Plan





- Goal/Treatment Plan


Need for Continued Stay: Remain at risks for inpatient hospitalization, Severe 

depression anxiety, Discharge may exacerbated symptoms, Failed transitioning, 

Severe functional impairment


Progress Toward Problem(s) and Goals/Treatment Plan: 





* c/w current treatment and plan~ patient is s/p two ECT and tolerating well. 





* Appreciate f/u by Dr. Tam on 10/27/18~Blood pressure is well 

  controlled.  Blood sugar is reasonably controlled.


* Appreciate f/u by  on 10/27/18~encourage CPAP, keep head at 45 degrees


  


* No new weekend lab results thus far


* Vitals reviewed and noted below:


                                                                Selected Entries











  10/27/18 10/27/18





  07:18 22:00


 


Temperature 98.1 F 


 


Pulse Rate 71 70


 


Respiratory 20 





Rate  


 


Blood Pressure 106/64 








                                                                                


Estimated Date of D/C: 11/02/18

## 2018-10-28 NOTE — PN
DATE:  10/28/2018



LOCATION:  The patient is in [facility] Behavioral Care Unit

in 518, bed 1.



SUBJECTIVE:  The patient admitted with depression.  The patient also has

history of diabetes, chronic lung disease, hypertension.  The patient also

had history of alcoholism.  He is seen this morning, he had received 2 ECT

treatments in the hospital.



PHYSICAL EXAMINATION:

VITAL SIGNS:  This morning, his pulse is 59, blood pressure 125/74,

respirations are 20, O2 sat is 98% on room air.

HEENT:  The patient's head is normocephalic.

HEART:  Normal sinus rhythm.  S1, S2 present.

LUNGS:  Clear.

ABDOMEN:  Soft.  Liver, spleen not palpable.  Obesity present.

CNS:  No focal deficits.



LABORATORY DATA:  The patient's blood work remains the same.



MEDICATIONS:  The patient is on Advair twice a day for chronic lung

disease.  The patient has history of smoking.  The patient is on folic

acid, metformin, insulin, glipizide.  The patient is also getting magnesium

oxide, gabapentin, pantoprazole, Seroquel, Sonata and atenolol which is

Tenormin 50 mg daily.



ASSESSMENT AND PLAN:  The patient is still depressed this morning.  He says

he is very tired and not able to sleep.  We will continue current

management.  Follow up with Care in the unit.







__________________________________________

Manuel Tam MD



DD:  10/28/2018 7:50:20

DT:  10/28/2018 9:44:03

Job # [id number]

## 2018-10-29 RX ADMIN — Medication SCH MG: at 08:39

## 2018-10-29 RX ADMIN — INSULIN HUMAN SCH UNIT: 100 INJECTION, SOLUTION PARENTERAL at 08:40

## 2018-10-29 RX ADMIN — Medication SCH MG: at 16:10

## 2018-10-29 RX ADMIN — INSULIN DETEMIR SCH U: 100 INJECTION, SOLUTION SUBCUTANEOUS at 21:41

## 2018-10-29 RX ADMIN — THERA TABS SCH TAB: TAB at 08:39

## 2018-10-29 RX ADMIN — INSULIN HUMAN SCH: 100 INJECTION, SOLUTION PARENTERAL at 21:41

## 2018-10-29 RX ADMIN — MAGNESIUM HYDROXIDE PRN ML: 400 SUSPENSION ORAL at 14:43

## 2018-10-29 RX ADMIN — PANTOPRAZOLE SODIUM SCH MG: 40 TABLET, DELAYED RELEASE ORAL at 08:39

## 2018-10-29 RX ADMIN — FLUTICASONE PROPIONATE AND SALMETEROL SCH PUFF: 50; 500 POWDER RESPIRATORY (INHALATION) at 06:10

## 2018-10-29 RX ADMIN — FLUTICASONE PROPIONATE AND SALMETEROL SCH PUFF: 50; 500 POWDER RESPIRATORY (INHALATION) at 18:01

## 2018-10-29 RX ADMIN — INSULIN HUMAN SCH UNIT: 100 INJECTION, SOLUTION PARENTERAL at 12:33

## 2018-10-29 RX ADMIN — INSULIN HUMAN SCH: 100 INJECTION, SOLUTION PARENTERAL at 16:37

## 2018-10-29 NOTE — PN
DATE:  10/28/2018



PULMONARY PROGRESS NOTE



REFERRING PHYSICIAN:  Raisa Rascon MD



SUBJECTIVE:  He is sitting up in a chair.  Night was unremarkable.  Get

better nicely _____.  No headache.  No rhinitis.  No nausea, vomiting or

diarrhea.  No leg pain or leg swelling.



OBJECTIVE:

GENERAL:  In no acute distress.

VITAL SIGNS:  Temperature is 98, heart rate is 59, respiratory rate is 20,

blood pressure 127/74.

HEENT:  Moist mucous membranes.  Crowded airway.  Mallampati score is 4.

NECK:  Supple.  No JVD.

LUNGS:  Have a fair airflow with rhonchi.

HEART:  S1 and S2.

ABDOMEN:  Soft, nontender.  No organomegaly.

EXTREMITIES:  No edema.

NEUROLOGIC:  Awake and alert.  Follows simple commands.



MEDICATIONS:  He is on Advair 500/50 one puff twice a day, Celexa 20 mg

daily, folic acid 1 mg daily, metformin 1000 mg twice a day, glipizide 10

mg daily, insulin coverage, Levemir 40 units subcu at bedtime, MiraLax 30

mL p.r.n., mag oxide 400 mg twice a day, Neurontin 300 mg 3 times a day,

Protonix 40 mg daily, Seroquel 300 mg at bedtime, IV fluid normal saline

100 mL per hour was given during ECT therapy, Sonata 10 mg at bedtime,

Tenormin 50 mg daily, multivitamins daily, Tylenol p.r.n., Ventolin 2 puffs

every 6 hours p.r.n., vitamin B1 100 mg daily, Zestril 2.5 mg daily.



LABORATORY DATA:  Reviewed.  No new lab is available since yesterday.



IMPRESSION AND PLAN:  Schizoaffective disorder, chronic obstructive lung

disease, sleep apnea syndrome, morbid obesity, insomnia.  Pulmonary point

of view, he is doing okay.  Continue inhaled bronchodilator.  Encourage

continuous positive airway pressure use.  Careful with sedation.  Fall

precaution.



Thank you and we will follow with you.





__________________________________________

Zhane Joshi MD



DD:  10/28/2018 20:57:37

DT:  10/28/2018 23:19:17

Job # [id number]
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
## 2018-10-29 NOTE — CP.PCM.PN
Subjective





- Date & Time of Evaluation


Date of Evaluation: 10/29/18


Time of Evaluation: 07:45





- Subjective


Subjective: 





Patient is seen this morning.  He has no medical complaints.





Objective





- Vital Signs/Intake and Output


Vital Signs (last 24 hours): 


                                        











Temp Pulse Resp BP Pulse Ox


 


 97.7 F   59 L  19   109/69   98 


 


 10/29/18 07:00  10/29/18 07:00  10/29/18 07:00  10/29/18 07:00  10/25/18 11:40











- Medications


Medications: 


                               Current Medications





Acetaminophen (Tylenol 325mg Tab)  650 mg PO Q6H PRN


   PRN Reason: Pain, severe (8-10)


   Last Admin: 10/25/18 12:53 Dose:  650 mg


Al Hydrox/Mg Hydrox/Simethicone (Maalox Plus 30 Ml)  30 ml PO DAILY PRN


   PRN Reason: Indigestion / Heartburn


Albuterol (Ventolin Hfa 90 Mcg/Actuation (8 G))  2 puff IH [id number] PRN


   PRN Reason: Shortness of Breath


Atenolol (Tenormin)  50 mg PO DAILY [facility]


   Last Admin: 10/28/18 08:32 Dose:  50 mg


Citalopram Hydrobromide (Celexa)  20 mg PO DAILY [facility]


   Last Admin: 10/28/18 08:34 Dose:  20 mg


Folic Acid (Folic Acid)  1 mg PO DAILY [facility]


   Last Admin: 10/28/18 08:34 Dose:  1 mg


Gabapentin (Neurontin)  300 mg PO TID [facility]; Protocol


   Last Admin: 10/28/18 17:37 Dose:  300 mg


Glipizide (Glucotrol)  10 mg PO DAILY [facility]


   Last Admin: 10/28/18 08:34 Dose:  10 mg


Sodium Chloride (Sodium Chloride 0.9%)  1,000 mls @ 100 mls/hr IV .Q10H [facility]


Insulin Detemir (Levemir)  40 unit SC HS [facility]


   Last Admin: 10/28/18 21:34 Dose:  40 u


Insulin Human Regular (Humulin R Low)  0 units SC ACHS [facility]; Protocol


   Last Admin: 10/28/18 22:39 Dose:  Not Given


Lisinopril (Zestril)  2.5 mg PO DAILY [facility]


   Last Admin: 10/28/18 08:34 Dose:  2.5 mg


Magnesium Hydroxide (Milk Of Magnesia)  30 ml PO DAILY PRN


   PRN Reason: Constipation


   Last Admin: 10/27/18 15:02 Dose:  30 ml


Magnesium Oxide (Mag-Ox)  400 mg PO BID [facility]


   Last Admin: 10/28/18 17:37 Dose:  400 mg


Metformin HCl (Glucophage)  1,000 mg PO BID [facility]


   Last Admin: 10/28/18 17:37 Dose:  1,000 mg


Multivitamins (Thera Tab)  1 tab PO 0800 [facility]


   Last Admin: 10/28/18 08:34 Dose:  1 tab


Pantoprazole Sodium (Protonix Ec Tab)  40 mg PO DAILY [facility]


   Last Admin: 10/28/18 08:34 Dose:  40 mg


Quetiapine Fumarate (Seroquel)  300 mg PO HS [facility]; Protocol


   Last Admin: 10/28/18 21:33 Dose:  300 mg


Fluticasone/Salmeterol (Advair Diskus 500/50)  1 puff INH Q12 [facility]


   Last Admin: 10/29/18 06:10 Dose:  1 puff


Thiamine HCl (Vitamin B1 Tab)  100 mg PO DAILY [facility]


   Last Admin: 10/28/18 08:34 Dose:  100 mg


Zaleplon (Sonata)  10 mg PO HS [facility]


   Last Admin: 10/28/18 21:33 Dose:  10 mg











- Labs


Labs: 


                                        





                                 10/16/18 09:30 





                                 10/24/18 07:20 











- Constitutional


Appears: No Acute Distress





- Head Exam


Head Exam: ATRAUMATIC, NORMOCEPHALIC





- Respiratory Exam


Respiratory Exam: Clear to Ausculation Bilateral, NORMAL BREATHING PATTERN





- Cardiovascular Exam


Cardiovascular Exam: REGULAR RHYTHM, +S1, +S2





- GI/Abdominal Exam


GI & Abdominal Exam: Soft, Normal Bowel Sounds.  absent: Tenderness





- Neurological Exam


Neurological Exam: Alert, Awake, Oriented x3





Assessment and Plan





- Assessment and Plan (Free Text)


Assessment: 





DMII


HTN


Sleep Apnea


Obesity


Chronic lung disease





Plan: 





Patient is on respiratory treatments for chronic lung disease.  Blood pressure 

is well controlled.


Patient is medically cleared for further ECT treatments if advised by 

psychiatry.  continue behavioral


treatment as per psychiatry.

## 2018-10-30 PROCEDURE — GZB4ZZZ OTHER ELECTROCONVULSIVE THERAPY: ICD-10-PCS | Performed by: PSYCHIATRY & NEUROLOGY

## 2018-10-30 RX ADMIN — INSULIN HUMAN SCH: 100 INJECTION, SOLUTION PARENTERAL at 16:23

## 2018-10-30 RX ADMIN — FLUTICASONE PROPIONATE AND SALMETEROL SCH: 50; 500 POWDER RESPIRATORY (INHALATION) at 16:31

## 2018-10-30 RX ADMIN — INSULIN HUMAN SCH: 100 INJECTION, SOLUTION PARENTERAL at 22:36

## 2018-10-30 RX ADMIN — MAGNESIUM HYDROXIDE PRN ML: 400 SUSPENSION ORAL at 16:39

## 2018-10-30 RX ADMIN — PANTOPRAZOLE SODIUM SCH MG: 40 TABLET, DELAYED RELEASE ORAL at 16:38

## 2018-10-30 RX ADMIN — INSULIN HUMAN SCH: 100 INJECTION, SOLUTION PARENTERAL at 16:42

## 2018-10-30 RX ADMIN — FLUTICASONE PROPIONATE AND SALMETEROL SCH PUFF: 50; 500 POWDER RESPIRATORY (INHALATION) at 17:43

## 2018-10-30 RX ADMIN — INSULIN HUMAN SCH: 100 INJECTION, SOLUTION PARENTERAL at 08:27

## 2018-10-30 RX ADMIN — Medication SCH: at 16:40

## 2018-10-30 RX ADMIN — Medication SCH MG: at 16:40

## 2018-10-30 RX ADMIN — THERA TABS SCH TAB: TAB at 16:39

## 2018-10-30 RX ADMIN — INSULIN DETEMIR SCH U: 100 INJECTION, SOLUTION SUBCUTANEOUS at 22:36

## 2018-10-30 NOTE — CP.PCM.PN
Subjective





- Date & Time of Evaluation


Date of Evaluation: 10/30/18


Time of Evaluation: 07:35





- Subjective


Subjective: 





Patient is admitted to the hospital with depression.  





Objective





- Vital Signs/Intake and Output


Vital Signs (last 24 hours): 


                                        











Temp Pulse Resp BP Pulse Ox


 


 98.1 F   82   20   118/78   98 


 


 10/30/18 07:27  10/30/18 07:27  10/30/18 07:27  10/30/18 07:27  10/25/18 11:40











- Medications


Medications: 


                               Current Medications





Acetaminophen (Tylenol 325mg Tab)  650 mg PO Q6H PRN


   PRN Reason: Pain, severe (8-10)


   Last Admin: 10/25/18 12:53 Dose:  650 mg


Al Hydrox/Mg Hydrox/Simethicone (Maalox Plus 30 Ml)  30 ml PO DAILY PRN


   PRN Reason: Indigestion / Heartburn


Albuterol (Ventolin Hfa 90 Mcg/Actuation (8 G))  2 puff IH [id number] PRN


   PRN Reason: Shortness of Breath


Atenolol (Tenormin)  50 mg PO DAILY [facility]


   Last Admin: 10/29/18 08:42 Dose:  Not Given


Citalopram Hydrobromide (Celexa)  20 mg PO DAILY [facility]


   Last Admin: 10/29/18 08:40 Dose:  20 mg


Folic Acid (Folic Acid)  1 mg PO DAILY [facility]


   Last Admin: 10/29/18 08:39 Dose:  1 mg


Gabapentin (Neurontin)  600 mg PO TID [facility]; Protocol


   Last Admin: 10/29/18 18:02 Dose:  600 mg


Glipizide (Glucotrol)  10 mg PO DAILY [facility]


   Last Admin: 10/29/18 08:40 Dose:  10 mg


Sodium Chloride (Sodium Chloride 0.9%)  1,000 mls @ 100 mls/hr IV .Q10H [facility]


Insulin Detemir (Levemir)  40 unit SC HS [facility]


   Last Admin: 10/29/18 21:41 Dose:  40 u


Insulin Human Regular (Humulin R Low)  0 units SC ACHS [facility]; Protocol


   Last Admin: 10/29/18 21:41 Dose:  Not Given


Lisinopril (Zestril)  2.5 mg PO DAILY [facility]


   Last Admin: 10/29/18 08:42 Dose:  2.5 mg


Magnesium Hydroxide (Milk Of Magnesia)  30 ml PO DAILY PRN


   PRN Reason: Constipation


   Last Admin: 10/29/18 14:43 Dose:  30 ml


Magnesium Oxide (Mag-Ox)  400 mg PO BID [facility]


   Last Admin: 10/29/18 16:10 Dose:  400 mg


Metformin HCl (Glucophage)  1,000 mg PO BID [facility]


   Last Admin: 10/29/18 16:10 Dose:  1,000 mg


Multivitamins (Thera Tab)  1 tab PO 0800 [facility]


   Last Admin: 10/29/18 08:39 Dose:  1 tab


Pantoprazole Sodium (Protonix Ec Tab)  40 mg PO DAILY [facility]


   Last Admin: 10/29/18 08:39 Dose:  40 mg


Quetiapine Fumarate (Seroquel)  300 mg PO HS [facility]; Protocol


   Last Admin: 10/29/18 21:42 Dose:  300 mg


Fluticasone/Salmeterol (Advair Diskus 500/50)  1 puff INH Q12 [facility]


   Last Admin: 10/29/18 18:01 Dose:  1 puff


Thiamine HCl (Vitamin B1 Tab)  100 mg PO DAILY [facility]


   Last Admin: 10/29/18 08:39 Dose:  100 mg


Zaleplon (Sonata)  10 mg PO [facility]


   Last Admin: 10/29/18 21:42 Dose:  10 mg











- Labs


Labs: 


                                        





                                 10/16/18 09:30 





                                 10/24/18 07:20 











- Constitutional


Appears: No Acute Distress





- Head Exam


Head Exam: ATRAUMATIC, NORMOCEPHALIC





- Respiratory Exam


Respiratory Exam: Clear to Ausculation Bilateral, NORMAL BREATHING PATTERN





- Cardiovascular Exam


Cardiovascular Exam: REGULAR RHYTHM, +S1, +S2





- GI/Abdominal Exam


GI & Abdominal Exam: Soft, Normal Bowel Sounds.  absent: Tenderness





- Neurological Exam


Neurological Exam: Alert, Awake, Oriented x3





Assessment and Plan





- Assessment and Plan (Free Text)


Assessment: 





Schizoaffective disorder


HTN


DMII





Plan: 





continue behavioral treatment


continue medications


will check CBC, CMP
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
## 2018-10-30 NOTE — PN
DATE:  10/29/2018



PULMONARY PROGRESS NOTE



REFERRING PHYSICIAN:  Raisa Rascon MD



SUBJECTIVE:  He is out of bed to chair.  Night was unremarkable.  Claiming

he has insomnia.  No headache.  No rhinitis.  No nausea.  No leg pain or

leg swelling.



OBJECTIVE:

GENERAL:  In no acute distress.

VITAL SIGNS:  Temperature 98, heart rate 73, respiratory rate is 20, and

blood pressure 144/95.

HEENT:  Moist mucous membrane.  Crowded airway.  Mallampati score is 4.

NECK:  Supple.  No JVD.

LUNGS:  Have a fair airflow with rhonchi.

HEART:  S1 and S2.

ABDOMEN:  Soft and nontender.  No organomegaly.

EXTREMITIES:  No edema.

NEUROLOGIC:  Awake and alert.  Follows simple command.



MEDICATIONS:  He is on Advair 500/50 one puff twice a day, Celexa 20 mg

daily, folic acid 1 mg daily, metformin 1000 mg twice a day, Glucotrol 10

mg daily, insulin coverage, Levemir 40 units subcutaneously at bedtime,

magnesium oxide 400 mg twice a day, milk of magnesia 30 mL daily,

gabapentin 600 mg three times a day, Protonix 40 mg daily, Seroquel 300 mg

at bedtime, IV fluid normal saline 100 mL per hour, Sonata 10 mg at

bedtime, Tenormin 50 mg daily, multivitamins daily, Tylenol p.r.n.,

Ventolin two puffs every 6 hours p.r.n., vitamin B 100 mg daily, Zestril

and 2.5 mg daily.



LABORATORY DATA:  Shows blood sugar 149.



IMPRESSION AND PLAN:  Schizoaffective disorder, chronic obstructive lung

disease, sleep apnea syndrome, morbid obesity, and insomnia.  Pulmonary

point of view doing okay.  Encourage continuous positive airway pressure

use.  Keep head at 45 degrees.  Inhaled bronchodilator.  Gastric

prophylaxis.  If sedated, needs close cardiopulmonary monitoring.



Thank you and we will follow with you.



__________________________________________

Zhane Joshi MD





DD:  10/30/2018 0:05:15

DT:  10/30/2018 2:26:02

Job # [id number]

## 2018-10-31 LAB
ALBUMIN SERPL-MCNC: 4.2 G/DL (ref 3–4.8)
ALBUMIN/GLOB SERPL: 1.6 {RATIO} (ref 1.1–1.8)
ALT SERPL-CCNC: 80 U/L (ref 7–56)
AST SERPL-CCNC: 40 U/L (ref 17–59)
BASOPHILS # BLD AUTO: 0 K/MM3 (ref 0–2)
BASOPHILS NFR BLD: 0 % (ref 0–3)
BUN SERPL-MCNC: 16 MG/DL (ref 7–21)
CALCIUM SERPL-MCNC: 9.6 MG/DL (ref 8.4–10.5)
EOSINOPHIL # BLD: 0.1 10*3/UL (ref 0–0.7)
EOSINOPHIL NFR BLD: 1.3 % (ref 1.5–5)
ERYTHROCYTE [DISTWIDTH] IN BLOOD BY AUTOMATED COUNT: 14.1 % (ref 11.5–14.5)
GFR NON-AFRICAN AMERICAN: > 60
GRANULOCYTES # BLD: 3.38 10*3/UL (ref 1.4–6.5)
GRANULOCYTES NFR BLD: 55.1 % (ref 50–68)
HGB BLD-MCNC: 14.1 G/DL (ref 14–18)
LYMPHOCYTES # BLD: 2.3 10*3/UL (ref 1.2–3.4)
LYMPHOCYTES NFR BLD AUTO: 36.6 % (ref 22–35)
MCH RBC QN AUTO: 30.3 PG (ref 25–35)
MCHC RBC AUTO-ENTMCNC: 33.3 G/DL (ref 31–37)
MCV RBC AUTO: 91.2 FL (ref 80–105)
MONOCYTES # BLD AUTO: 0.4 10*3/UL (ref 0.1–0.6)
MONOCYTES NFR BLD: 7 % (ref 1–6)
PLATELET # BLD: 260 10^3/UL (ref 120–450)
PMV BLD AUTO: 9.5 FL (ref 7–11)
RBC # BLD AUTO: 4.65 10^6/UL (ref 3.5–6.1)
WBC # BLD AUTO: 6.1 10^3/UL (ref 4.5–11)

## 2018-10-31 RX ADMIN — Medication SCH MG: at 08:52

## 2018-10-31 RX ADMIN — Medication SCH MG: at 16:19

## 2018-10-31 RX ADMIN — FLUTICASONE PROPIONATE AND SALMETEROL SCH PUFF: 50; 500 POWDER RESPIRATORY (INHALATION) at 06:33

## 2018-10-31 RX ADMIN — INSULIN HUMAN SCH: 100 INJECTION, SOLUTION PARENTERAL at 21:43

## 2018-10-31 RX ADMIN — INSULIN HUMAN SCH UNIT: 100 INJECTION, SOLUTION PARENTERAL at 12:40

## 2018-10-31 RX ADMIN — FLUTICASONE PROPIONATE AND SALMETEROL SCH PUFF: 50; 500 POWDER RESPIRATORY (INHALATION) at 18:15

## 2018-10-31 RX ADMIN — INSULIN HUMAN SCH UNIT: 100 INJECTION, SOLUTION PARENTERAL at 17:26

## 2018-10-31 RX ADMIN — PANTOPRAZOLE SODIUM SCH MG: 40 TABLET, DELAYED RELEASE ORAL at 08:52

## 2018-10-31 RX ADMIN — INSULIN DETEMIR SCH U: 100 INJECTION, SOLUTION SUBCUTANEOUS at 21:32

## 2018-10-31 RX ADMIN — INSULIN HUMAN SCH: 100 INJECTION, SOLUTION PARENTERAL at 08:50

## 2018-10-31 RX ADMIN — THERA TABS SCH TAB: TAB at 08:52

## 2018-10-31 NOTE — PN
DATE:  10/31/2018



LOCATION:  The patient is in the Behavioral Care Unit at [facility], room 518, bed 1.



SUBJECTIVE:  The patient is a 52-year-old white man.  The patient was

admitted with depression and has been treated with ECT couple of times. 

The patient has a medical history of diabetes, hypertension, chronic lung

disease, and alcoholism.



PHYSICAL EXAMINATION:

VITAL SIGNS:  This morning, his pulse is 71, blood pressure 100/62,

respirations are 20, and O2 stat is 98% on room air.

HEENT:  The patient's head is normocephalic.

NECK:  Thyroid not enlarged.  JVP is flat.

LUNGS:  Trachea is central.  Breath sounds are vesicular.  No adventitious

sounds.

HEART:  Normal sinus rhythm.  S1, S2 present.  No murmurs.

ABDOMEN:  Soft.  Liver and spleen not palpable.  There is distention of the

abdomen.  No ascites.

CENTRAL NERVOUS SYSTEM:  Cranial nerves are intact.  Motor and sensory

functions clinically okay, but the patient has a history of peripheral

neuritis.



MEDICATIONS:  The patient is on Advair twice a day.  The patient is on

Celexa, Desyrel, folic acid, Glucophage, glipizide, insulin coverage.  The

patient is on magnesium oxide, gabapentin, pantoprazole, Seroquel.  The

patient is also getting Sonata for sleep 10 mg at night.



LABORATORY DATA:  The patient's blood sugar is 182.  His CBC is without

change.



ASSESSMENT AND PLAN:  The patient's condition is clinically stable,

improving on clinical status.  We will continue current management.  Follow

up with the psych floor.





__________________________________________

Manuel Tam MD





DD:  10/31/2018 8:15:00

DT:  10/31/2018 10:41:20

Job # [id number]

## 2018-10-31 NOTE — PN
DATE:  10/30/2018



PULMONARY PROGRESS NOTE



REFERRING PHYSICIAN:  Raisa Rascon MD.



SUBJECTIVE:  The patient is sitting up in a chair, just came back from ECT

therapy.  Still having some insomnia.  No cough.  No sputum production.  No

nausea, vomiting, diarrhea, leg pain or leg swelling.



OBJECTIVE:

GENERAL:  In no acute distress.

VITAL SIGNS:  Temperature is 98, heart rate is 69, respiratory rate is 14,

blood pressure 138/91, pulse ox 96% on room air.

HEENT:  Moist mucous membrane.  Crowded airway.  Mallampati score is 4.

NECK:  Supple.  No JVD.

LUNGS:  Have a fair airflow with rhonchi.

HEART:  S1 and S2.

ABDOMEN:  Soft, nontender.  No organomegaly.

EXTREMITIES:  No edema.

NEUROLOGICAL:   Awake and alert.  Follows simple command.



MEDICATIONS:  He is on Advair 500/50 one puff twice a day, Celexa 20 mg

daily, trazodone 100 mg at bedtime, folic acid 1 mg daily, Glucophage 1000

mg twice a day, glipizide 10 mg daily, insulin coverage, Levemir 40 units

subcu at bedtime, Maalox p.r.n. basis, mag oxide 400 mg twice a day, milk

of magnesia 30 mL daily p.r.n., gabapentin 600 mg three times a day,

Protonix 40 mg daily, Seroquel 300 mg at bedtime, IV fluid normal saline

100 mL per hour, Sonata 10 mg at bedtime, Tenormin 50 mg daily,

multivitamins daily, Tylenol p.r.n., thiamine 100 mg daily, Zestril 2.5 mg

daily.



LABORATORY DATA:  Reviewed.  Blood sugar this evening was 182.



IMPRESSION AND PLAN:  Schizoaffective disorder, chronic obstructive lung

disease, sleep apnea syndrome, morbid obesity, insomnia.  Pulmonary point

of view, doing well.  Continue bronchodilator.  Encourage continuous

positive airway pressure use.  Careful with sedation.  Fall precaution. 

Gastric prophylaxis.



Thank you and we will follow with you.





__________________________________________

Zhane Joshi MD





DD:  10/30/2018 22:55:48

DT:  10/31/2018 0:10:20

Job # [id number]
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
## 2018-10-31 NOTE — PN
DATE:  10/31/2018



PULMONARY PROGRESS NOTE



REFERRING PHYSICIAN:  Raisa Rascon MD.



SUBJECTIVE:  He is out of bed to chair.  Night was unremarkable.  Got some

better sleep last night.  No headache.  No rhinitis.  No nausea.  No

vomiting, diarrhea, leg pain, leg swelling.



OBJECTIVE:

GENERAL:  In no acute distress.

VITAL SIGNS:  Temperature is 98, heart rate is 71, respiratory rate is 20,

blood pressure 100/62.

HEENT:  Moist mucous membrane.  Crowded airway.  Mallampati score is 4.

NECK:  Supple.  No JVD.

LUNGS:  Have a fair airflow with few rhonchi.

HEART:  S1 and S2.

ABDOMEN:  Soft, nontender.  No organomegaly.

EXTREMITIES:  There is no edema.

NEUROLOGICAL:   Awake, alert.  Follows simple command.



MEDICATIONS:  He is on Advair 500/50 one puff twice a day, Celexa 20 mg

daily, trazodone 200 mg at bedtime, folic acid 1 mg daily, metformin 1000

mg twice a day, glipizide 10 mg daily, insulin coverage, Levemir 40 units

subcu at bedtime, magnesium oxide 400 mg twice a day, milk of magnesia 30

mL daily, gabapentin 600 mg three times a day, Protonix 40 mg daily,

Seroquel 300 mg at bedtime, IV fluid normal saline 100 mL per hour, Sonata

10 mg at bedtime, Tenormin 50 mg daily, multivitamins daily, Tylenol

p.r.n., Ventolin HFA every 6 hours p.r.n., thiamine 100 mg daily, Zestril

2.5 mg daily.



LABORATORY DATA:  Shows hemoglobin 14.1, hematocrit 42.4, WBC 6.1, platelet

is 260.  Sodium 140, potassium 4.1, chloride 101, bicarbonate 30, BUN 16,

creatinine 0.9, glucose 148, calcium 9.6, AST 40, ALT 80, alk phos is 63,

albumin is 4.2.



IMPRESSION AND PLAN:  Schizoaffective disorder, chronic obstructive lung

disease, sleep apnea syndrome, morbid obesity, insomnia.  Pulmonary point

of view, doing well.  Continue bronchodilator.  Encourage continuous

positive airway pressure use at night and careful with sedation.  Fall

precaution.  Urged the patient to stop smoking.  Outpatient PFT.



Thank you and we will follow with you.





__________________________________________

Zhane Joshi MD





DD:  10/31/2018 21:59:06

DT:  10/31/2018 22:17:40

[facility] # [id number]

## 2018-11-01 VITALS — HEART RATE: 88 BPM | SYSTOLIC BLOOD PRESSURE: 141 MMHG | DIASTOLIC BLOOD PRESSURE: 88 MMHG

## 2018-11-01 VITALS — OXYGEN SATURATION: 100 % | RESPIRATION RATE: 16 BRPM

## 2018-11-01 VITALS — TEMPERATURE: 98.2 F

## 2018-11-01 PROCEDURE — GZB4ZZZ OTHER ELECTROCONVULSIVE THERAPY: ICD-10-PCS | Performed by: PSYCHIATRY & NEUROLOGY

## 2018-11-01 RX ADMIN — INSULIN HUMAN SCH: 100 INJECTION, SOLUTION PARENTERAL at 07:30

## 2018-11-01 RX ADMIN — FLUTICASONE PROPIONATE AND SALMETEROL SCH PUFF: 50; 500 POWDER RESPIRATORY (INHALATION) at 06:54

## 2018-11-01 RX ADMIN — THERA TABS SCH TAB: TAB at 13:23

## 2018-11-01 RX ADMIN — INSULIN HUMAN SCH: 100 INJECTION, SOLUTION PARENTERAL at 13:30

## 2018-11-01 RX ADMIN — Medication SCH MG: at 13:23

## 2018-11-01 RX ADMIN — PANTOPRAZOLE SODIUM SCH MG: 40 TABLET, DELAYED RELEASE ORAL at 13:23

## 2018-11-01 NOTE — PN
DATE:  11/01/2018



SUBJECTIVE:  He is a 52-year-old male.  He is in room 518, bed 1 behavioral

care unit.  The patient was admitted with depression.  He has had a couple

of ECTs and the patient's past medical history significant that he is being

treated for hypertension.  The patient has history of diabetes, chronic

lung disease and also alcoholism.  The patient is seen this morning.  He is

lying down, resting.  He says that he is going to get a treatment here and

then he will be going home.



PHYSICAL EXAMINATION:

VITAL SIGNS:  The pulse is 64, blood pressure 110/85, respirations are 20,

the patient's temperature 98.4.

HEENT:  Head is normocephalic.

LUNGS:  Trachea central.  Breath sounds are vesicular.  No adventitious

sounds heard.

HEART:  Normal sinus rhythm.  S1 and S2 present.

ABDOMEN:  Soft, obesity present.

CNS:  No focal deficit.



MEDICATIONS:  The patient's list of medications, the patient is on Advair 1

puff twice a day.  The patient is also getting Celexa, Desyrel, folic acid,

metformin, Glucotrol, insulin coverage for his sugar.  The patient has

magnesium oxide 400 mg b.i.d., gabapentin, pantoprazole, Seroquel, Sonata,

atenolol which is Tenormin 50 mg daily.



LABORATORY DATA:  The patient's lab work, the recent CBC, he has not much

change.  Chemistry is not posted.  All the chemistry is posted done on

10/31/2018 that is yesterday is ALT is 80, AST is 40.



ASSESSMENT AND PLAN:  The patient has history of alcoholism, but enzyme

elevation might be due to the history of alcohol use.  He is clinically

stable.  The patient will have his treatment today and then he will be

discharged to follow up with his primary care physician.  He will be given

a prescription for Advair 250/50 b.i.d.  He says he has all his other

medications at home.





__________________________________________

Manuel Tam MD





DD:  11/01/2018 8:17:58

DT:  11/01/2018 8:26:43

Job # [id number]
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
## 2018-12-24 ENCOUNTER — HOSPITAL ENCOUNTER (INPATIENT)
Dept: HOSPITAL 31 - C.ER | Age: 53
LOS: 7 days | Discharge: HOME | DRG: 885 | End: 2018-12-31
Attending: PSYCHIATRY & NEUROLOGY | Admitting: PSYCHIATRY & NEUROLOGY
Payer: COMMERCIAL

## 2018-12-24 VITALS — BODY MASS INDEX: 33.2 KG/M2

## 2018-12-24 DIAGNOSIS — F10.229: ICD-10-CM

## 2018-12-24 DIAGNOSIS — F41.0: ICD-10-CM

## 2018-12-24 DIAGNOSIS — J44.9: ICD-10-CM

## 2018-12-24 DIAGNOSIS — F10.239: ICD-10-CM

## 2018-12-24 DIAGNOSIS — F17.210: ICD-10-CM

## 2018-12-24 DIAGNOSIS — R45.851: ICD-10-CM

## 2018-12-24 DIAGNOSIS — E78.00: ICD-10-CM

## 2018-12-24 DIAGNOSIS — E11.9: ICD-10-CM

## 2018-12-24 DIAGNOSIS — F25.0: Primary | ICD-10-CM

## 2018-12-24 DIAGNOSIS — I10: ICD-10-CM

## 2018-12-24 DIAGNOSIS — G47.30: ICD-10-CM

## 2018-12-24 LAB
ALBUMIN SERPL-MCNC: 4.7 G/DL (ref 3.5–5)
ALBUMIN/GLOB SERPL: 1.8 {RATIO} (ref 1–2.1)
ALT SERPL-CCNC: 51 U/L (ref 21–72)
APAP SERPL-MCNC: < 10 UG/ML (ref 10–30)
AST SERPL-CCNC: 41 U/L (ref 17–59)
BASOPHILS # BLD AUTO: 0.1 K/UL (ref 0–0.2)
BASOPHILS NFR BLD: 0.8 % (ref 0–2)
BILIRUB UR-MCNC: NEGATIVE MG/DL
BUN SERPL-MCNC: 10 MG/DL (ref 9–20)
BUN SERPL-MCNC: 10 MG/DL (ref 9–20)
CALCIUM SERPL-MCNC: 8.7 MG/DL (ref 8.6–10.4)
CALCIUM SERPL-MCNC: 9 MG/DL (ref 8.6–10.4)
EOSINOPHIL # BLD AUTO: 0 K/UL (ref 0–0.7)
EOSINOPHIL NFR BLD: 0.6 % (ref 0–4)
ERYTHROCYTE [DISTWIDTH] IN BLOOD BY AUTOMATED COUNT: 15.8 % (ref 11.5–14.5)
GFR NON-AFRICAN AMERICAN: > 60
GFR NON-AFRICAN AMERICAN: > 60
GLUCOSE UR STRIP-MCNC: (no result) MG/DL
HGB BLD-MCNC: 15.5 G/DL (ref 12–18)
LEUKOCYTE ESTERASE UR-ACNC: (no result) LEU/UL
LYMPHOCYTES # BLD AUTO: 2 K/UL (ref 1–4.3)
LYMPHOCYTES NFR BLD AUTO: 27.1 % (ref 20–40)
MCH RBC QN AUTO: 31 PG (ref 27–31)
MCHC RBC AUTO-ENTMCNC: 33.3 G/DL (ref 33–37)
MCV RBC AUTO: 93 FL (ref 80–94)
MONOCYTES # BLD: 0.4 K/UL (ref 0–0.8)
MONOCYTES NFR BLD: 5.4 % (ref 0–10)
NEUTROPHILS # BLD: 4.8 K/UL (ref 1.8–7)
NEUTROPHILS NFR BLD AUTO: 66.1 % (ref 50–75)
NRBC BLD AUTO-RTO: 0.2 % (ref 0–2)
PH UR STRIP: 6 [PH] (ref 5–8)
PLATELET # BLD: 367 K/UL (ref 130–400)
PMV BLD AUTO: 7.2 FL (ref 7.2–11.7)
PROT UR STRIP-MCNC: (no result) MG/DL
RBC # BLD AUTO: 5 MIL/UL (ref 4.4–5.9)
RBC # UR STRIP: NEGATIVE /UL
SALICYLATE: < 1 MG/DL 1
SP GR UR STRIP: 1.02 (ref 1–1.03)
SQUAMOUS EPITHIAL: < 1 /HPF (ref 0–5)
UROBILINOGEN UR-MCNC: NORMAL MG/DL (ref 0.2–1)
WBC # BLD AUTO: 7.3 K/UL (ref 4.8–10.8)

## 2018-12-24 PROCEDURE — GZ56ZZZ INDIVIDUAL PSYCHOTHERAPY, SUPPORTIVE: ICD-10-PCS | Performed by: PSYCHIATRY & NEUROLOGY

## 2018-12-24 PROCEDURE — GZ3ZZZZ MEDICATION MANAGEMENT: ICD-10-PCS | Performed by: PSYCHIATRY & NEUROLOGY

## 2018-12-24 PROCEDURE — HZ2ZZZZ DETOXIFICATION SERVICES FOR SUBSTANCE ABUSE TREATMENT: ICD-10-PCS | Performed by: PSYCHIATRY & NEUROLOGY

## 2018-12-24 PROCEDURE — GZHZZZZ GROUP PSYCHOTHERAPY: ICD-10-PCS | Performed by: PSYCHIATRY & NEUROLOGY

## 2018-12-24 RX ADMIN — FLUTICASONE FUROATE AND VILANTEROL TRIFENATATE SCH PUFF: 200; 25 POWDER RESPIRATORY (INHALATION) at 22:56

## 2018-12-25 RX ADMIN — PANTOPRAZOLE SODIUM SCH MG: 40 TABLET, DELAYED RELEASE ORAL at 09:20

## 2018-12-25 RX ADMIN — INSULIN GLARGINE SCH UNITS: 100 INJECTION, SOLUTION SUBCUTANEOUS at 21:43

## 2018-12-25 RX ADMIN — FLUTICASONE FUROATE AND VILANTEROL TRIFENATATE SCH PUFF: 200; 25 POWDER RESPIRATORY (INHALATION) at 08:16

## 2018-12-25 RX ADMIN — Medication SCH TAB: at 09:20

## 2018-12-26 RX ADMIN — Medication SCH TAB: at 10:08

## 2018-12-26 RX ADMIN — FLUTICASONE FUROATE AND VILANTEROL TRIFENATATE SCH: 200; 25 POWDER RESPIRATORY (INHALATION) at 09:56

## 2018-12-26 RX ADMIN — INSULIN GLARGINE SCH UNITS: 100 INJECTION, SOLUTION SUBCUTANEOUS at 21:40

## 2018-12-26 RX ADMIN — PANTOPRAZOLE SODIUM SCH MG: 40 TABLET, DELAYED RELEASE ORAL at 10:09

## 2018-12-26 RX ADMIN — POLYVINYL ALCOHOL PRN DROP: 14 SOLUTION/ DROPS OPHTHALMIC at 17:18

## 2018-12-27 RX ADMIN — FLUTICASONE FUROATE AND VILANTEROL TRIFENATATE SCH PUFF: 200; 25 POWDER RESPIRATORY (INHALATION) at 09:31

## 2018-12-27 RX ADMIN — POLYVINYL ALCOHOL PRN DROP: 14 SOLUTION/ DROPS OPHTHALMIC at 10:19

## 2018-12-27 RX ADMIN — INSULIN GLARGINE SCH UNITS: 100 INJECTION, SOLUTION SUBCUTANEOUS at 21:42

## 2018-12-27 RX ADMIN — Medication SCH TAB: at 09:38

## 2018-12-27 RX ADMIN — PANTOPRAZOLE SODIUM SCH MG: 40 TABLET, DELAYED RELEASE ORAL at 11:00

## 2018-12-28 RX ADMIN — INSULIN GLARGINE SCH UNITS: 100 INJECTION, SOLUTION SUBCUTANEOUS at 21:33

## 2018-12-28 RX ADMIN — PANTOPRAZOLE SODIUM SCH MG: 40 TABLET, DELAYED RELEASE ORAL at 10:03

## 2018-12-28 RX ADMIN — FLUTICASONE FUROATE AND VILANTEROL TRIFENATATE SCH PUFF: 200; 25 POWDER RESPIRATORY (INHALATION) at 08:37

## 2018-12-28 RX ADMIN — Medication SCH TAB: at 10:04

## 2018-12-29 VITALS — RESPIRATION RATE: 20 BRPM

## 2018-12-29 RX ADMIN — Medication SCH TAB: at 09:34

## 2018-12-29 RX ADMIN — FLUTICASONE FUROATE AND VILANTEROL TRIFENATATE SCH PUFF: 200; 25 POWDER RESPIRATORY (INHALATION) at 08:03

## 2018-12-29 RX ADMIN — NAPHAZOLINE HYDROCHLORIDE AND PHENIRAMINE MALEATE PRN DROP: .25; 3 SOLUTION/ DROPS OPHTHALMIC at 17:05

## 2018-12-29 RX ADMIN — PANTOPRAZOLE SODIUM SCH MG: 40 TABLET, DELAYED RELEASE ORAL at 09:34

## 2018-12-29 RX ADMIN — POLYVINYL ALCOHOL PRN DROP: 14 SOLUTION/ DROPS OPHTHALMIC at 12:01

## 2018-12-29 RX ADMIN — INSULIN GLARGINE SCH UNITS: 100 INJECTION, SOLUTION SUBCUTANEOUS at 21:24

## 2018-12-30 VITALS — TEMPERATURE: 97.4 F | OXYGEN SATURATION: 97 %

## 2018-12-30 VITALS — HEART RATE: 70 BPM

## 2018-12-30 RX ADMIN — NAPHAZOLINE HYDROCHLORIDE AND PHENIRAMINE MALEATE PRN DROP: .25; 3 SOLUTION/ DROPS OPHTHALMIC at 19:48

## 2018-12-30 RX ADMIN — INSULIN GLARGINE SCH UNITS: 100 INJECTION, SOLUTION SUBCUTANEOUS at 21:24

## 2018-12-30 RX ADMIN — NAPHAZOLINE HYDROCHLORIDE AND PHENIRAMINE MALEATE PRN DROP: .25; 3 SOLUTION/ DROPS OPHTHALMIC at 14:24

## 2018-12-30 RX ADMIN — Medication SCH TAB: at 09:49

## 2018-12-30 RX ADMIN — FLUTICASONE FUROATE AND VILANTEROL TRIFENATATE SCH PUFF: 200; 25 POWDER RESPIRATORY (INHALATION) at 08:12

## 2018-12-30 RX ADMIN — NAPHAZOLINE HYDROCHLORIDE AND PHENIRAMINE MALEATE PRN DROP: .25; 3 SOLUTION/ DROPS OPHTHALMIC at 08:12

## 2018-12-30 RX ADMIN — PANTOPRAZOLE SODIUM SCH MG: 40 TABLET, DELAYED RELEASE ORAL at 09:50

## 2018-12-31 VITALS — DIASTOLIC BLOOD PRESSURE: 78 MMHG | SYSTOLIC BLOOD PRESSURE: 134 MMHG

## 2018-12-31 RX ADMIN — POLYVINYL ALCOHOL PRN DROP: 14 SOLUTION/ DROPS OPHTHALMIC at 10:05

## 2018-12-31 RX ADMIN — PANTOPRAZOLE SODIUM SCH MG: 40 TABLET, DELAYED RELEASE ORAL at 10:00

## 2018-12-31 RX ADMIN — Medication SCH TAB: at 10:00

## 2018-12-31 RX ADMIN — FLUTICASONE FUROATE AND VILANTEROL TRIFENATATE SCH PUFF: 200; 25 POWDER RESPIRATORY (INHALATION) at 07:57

## 2019-01-24 ENCOUNTER — HOSPITAL ENCOUNTER (INPATIENT)
Dept: HOSPITAL 31 - C.ER | Age: 54
LOS: 5 days | Discharge: HOME | DRG: 191 | End: 2019-01-29
Attending: INTERNAL MEDICINE | Admitting: INTERNAL MEDICINE
Payer: COMMERCIAL

## 2019-01-24 VITALS — BODY MASS INDEX: 33.5 KG/M2

## 2019-01-24 DIAGNOSIS — E78.00: ICD-10-CM

## 2019-01-24 DIAGNOSIS — R45.851: ICD-10-CM

## 2019-01-24 DIAGNOSIS — G47.30: ICD-10-CM

## 2019-01-24 DIAGNOSIS — J44.1: Primary | ICD-10-CM

## 2019-01-24 DIAGNOSIS — R63.4: ICD-10-CM
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
## 2019-01-24 DIAGNOSIS — F17.210: ICD-10-CM

## 2019-01-24 DIAGNOSIS — Z79.4: ICD-10-CM

## 2019-01-24 DIAGNOSIS — Z87.01: ICD-10-CM

## 2019-01-24 DIAGNOSIS — F41.8: ICD-10-CM

## 2019-01-24 DIAGNOSIS — F25.0: ICD-10-CM

## 2019-01-24 DIAGNOSIS — F10.230: ICD-10-CM

## 2019-01-24 DIAGNOSIS — E11.65: ICD-10-CM

## 2019-01-24 LAB
ALBUMIN SERPL-MCNC: 4.8 G/DL (ref 3.5–5)
ALBUMIN/GLOB SERPL: 2.1 {RATIO} (ref 1–2.1)
ALT SERPL-CCNC: 64 U/L (ref 21–72)
APAP SERPL-MCNC: < 10 UG/ML (ref 10–30)
AST SERPL-CCNC: 75 U/L (ref 17–59)
BASOPHILS # BLD AUTO: 0.1 K/UL (ref 0–0.2)
BASOPHILS NFR BLD: 1.7 % (ref 0–2)
BILIRUB UR-MCNC: NEGATIVE MG/DL
BUN SERPL-MCNC: 9 MG/DL (ref 9–20)
CALCIUM SERPL-MCNC: 8.5 MG/DL (ref 8.6–10.4)
EOSINOPHIL # BLD AUTO: 0.2 K/UL (ref 0–0.7)
EOSINOPHIL NFR BLD: 2.9 % (ref 0–4)
ERYTHROCYTE [DISTWIDTH] IN BLOOD BY AUTOMATED COUNT: 15.8 % (ref 11.5–14.5)
GFR NON-AFRICAN AMERICAN: > 60
GLUCOSE UR STRIP-MCNC: (no result) MG/DL
HGB BLD-MCNC: 14 G/DL (ref 12–18)
HYALINE CASTS #/AREA URNS LPF: (no result) /LPF (ref 0–2)
LEUKOCYTE ESTERASE UR-ACNC: (no result) LEU/UL
LYMPHOCYTES # BLD AUTO: 0.7 K/UL (ref 1–4.3)
LYMPHOCYTES NFR BLD AUTO: 13.5 % (ref 20–40)
MCH RBC QN AUTO: 31.3 PG (ref 27–31)
MCHC RBC AUTO-ENTMCNC: 33.6 G/DL (ref 33–37)
MCV RBC AUTO: 93.1 FL (ref 80–94)
MONOCYTES # BLD: 0.6 K/UL (ref 0–0.8)
MONOCYTES NFR BLD: 10.6 % (ref 0–10)
NEUTROPHILS # BLD: 4 K/UL (ref 1.8–7)
NEUTROPHILS NFR BLD AUTO: 71.3 % (ref 50–75)
NRBC BLD AUTO-RTO: 0.3 % (ref 0–2)
PH UR STRIP: 5 [PH] (ref 5–8)
PLATELET # BLD: 195 K/UL (ref 130–400)
PMV BLD AUTO: 7.6 FL (ref 7.2–11.7)
PROT UR STRIP-MCNC: (no result) MG/DL
RBC # BLD AUTO: 4.49 MIL/UL (ref 4.4–5.9)
RBC # UR STRIP: NEGATIVE /UL
SALICYLATE: < 1 MG/DL 1
SP GR UR STRIP: 1.02 (ref 1–1.03)
SQUAMOUS EPITHIAL: < 1 /HPF (ref 0–5)
UROBILINOGEN UR-MCNC: NORMAL MG/DL (ref 0.2–1)
WBC # BLD AUTO: 5.5 K/UL (ref 4.8–10.8)

## 2019-01-25 RX ADMIN — ENOXAPARIN SODIUM SCH MG: 40 INJECTION SUBCUTANEOUS at 09:27

## 2019-01-25 RX ADMIN — INSULIN ASPART SCH UNIT: 100 INJECTION, SOLUTION INTRAVENOUS; SUBCUTANEOUS at 13:22

## 2019-01-25 RX ADMIN — PANTOPRAZOLE SODIUM SCH MG: 40 TABLET, DELAYED RELEASE ORAL at 09:27

## 2019-01-25 RX ADMIN — METHYLPREDNISOLONE SODIUM SUCCINATE SCH MG: 40 INJECTION, POWDER, FOR SOLUTION INTRAMUSCULAR; INTRAVENOUS at 21:24

## 2019-01-25 RX ADMIN — INSULIN GLARGINE SCH UNITS: 100 INJECTION, SOLUTION SUBCUTANEOUS at 21:25

## 2019-01-25 RX ADMIN — INSULIN ASPART SCH UNIT: 100 INJECTION, SOLUTION INTRAVENOUS; SUBCUTANEOUS at 17:08

## 2019-01-25 RX ADMIN — IPRATROPIUM BROMIDE AND ALBUTEROL SULFATE SCH ML: .5; 3 SOLUTION RESPIRATORY (INHALATION) at 09:15

## 2019-01-25 RX ADMIN — INSULIN ASPART SCH UNIT: 100 INJECTION, SOLUTION INTRAVENOUS; SUBCUTANEOUS at 22:03

## 2019-01-25 RX ADMIN — METHYLPREDNISOLONE SODIUM SUCCINATE SCH MG: 40 INJECTION, POWDER, FOR SOLUTION INTRAMUSCULAR; INTRAVENOUS at 13:24

## 2019-01-25 RX ADMIN — INSULIN ASPART SCH UNIT: 100 INJECTION, SOLUTION INTRAVENOUS; SUBCUTANEOUS at 09:26

## 2019-01-26 LAB
ALBUMIN SERPL-MCNC: 4.5 G/DL (ref 3.5–5)
ALBUMIN/GLOB SERPL: 1.8 {RATIO} (ref 1–2.1)
ALT SERPL-CCNC: 50 U/L (ref 21–72)
ANISOCYTOSIS BLD QL SMEAR: SLIGHT
AST SERPL-CCNC: 28 U/L (ref 17–59)
BASOPHILS # BLD AUTO: 0 K/UL (ref 0–0.2)
BASOPHILS NFR BLD: 0.1 % (ref 0–2)
BUN SERPL-MCNC: 20 MG/DL (ref 9–20)
CALCIUM SERPL-MCNC: 9.5 MG/DL (ref 8.6–10.4)
EOSINOPHIL # BLD AUTO: 0 K/UL (ref 0–0.7)
EOSINOPHIL NFR BLD: 0 % (ref 0–4)
ERYTHROCYTE [DISTWIDTH] IN BLOOD BY AUTOMATED COUNT: 16.1 % (ref 11.5–14.5)
GFR NON-AFRICAN AMERICAN: > 60
HGB BLD-MCNC: 14.2 G/DL (ref 12–18)
LYMPHOCYTE: 7 % (ref 20–40)
LYMPHOCYTES # BLD AUTO: 0.5 K/UL (ref 1–4.3)
LYMPHOCYTES NFR BLD AUTO: 7.2 % (ref 20–40)
MACROCYTES BLD QL SMEAR: SLIGHT
MCH RBC QN AUTO: 31.2 PG (ref 27–31)
MCHC RBC AUTO-ENTMCNC: 33.5 G/DL (ref 33–37)
MCV RBC AUTO: 93.4 FL (ref 80–94)
MONOCYTE: 5 % (ref 0–10)
MONOCYTES # BLD: 0.2 K/UL (ref 0–0.8)
MONOCYTES NFR BLD: 2.9 % (ref 0–10)
NEUTROPHILS # BLD: 5.6 K/UL (ref 1.8–7)
NEUTROPHILS NFR BLD AUTO: 88 % (ref 50–75)
NEUTROPHILS NFR BLD AUTO: 89.8 % (ref 50–75)
NRBC BLD AUTO-RTO: 0 % (ref 0–2)
PLATELET # BLD EST: NORMAL 10*3/UL
PLATELET # BLD: 209 K/UL (ref 130–400)
PMV BLD AUTO: 8 FL (ref 7.2–11.7)
POLYCHROMIC: SLIGHT
RBC # BLD AUTO: 4.55 MIL/UL (ref 4.4–5.9)
TOTAL CELLS COUNTED BLD: 100
WBC # BLD AUTO: 6.2 K/UL (ref 4.8–10.8)

## 2019-01-26 RX ADMIN — METHYLPREDNISOLONE SODIUM SUCCINATE SCH MG: 40 INJECTION, POWDER, FOR SOLUTION INTRAMUSCULAR; INTRAVENOUS at 05:45

## 2019-01-26 RX ADMIN — ENOXAPARIN SODIUM SCH MG: 40 INJECTION SUBCUTANEOUS at 10:37

## 2019-01-26 RX ADMIN — PANTOPRAZOLE SODIUM SCH MG: 40 TABLET, DELAYED RELEASE ORAL at 10:38

## 2019-01-26 RX ADMIN — METHYLPREDNISOLONE SODIUM SUCCINATE SCH MG: 40 INJECTION, POWDER, FOR SOLUTION INTRAMUSCULAR; INTRAVENOUS at 21:49

## 2019-01-26 RX ADMIN — INSULIN ASPART SCH UNIT: 100 INJECTION, SOLUTION INTRAVENOUS; SUBCUTANEOUS at 08:45

## 2019-01-26 RX ADMIN — INSULIN ASPART SCH UNIT: 100 INJECTION, SOLUTION INTRAVENOUS; SUBCUTANEOUS at 21:47

## 2019-01-26 RX ADMIN — INSULIN ASPART SCH: 100 INJECTION, SOLUTION INTRAVENOUS; SUBCUTANEOUS at 21:50

## 2019-01-26 RX ADMIN — IPRATROPIUM BROMIDE AND ALBUTEROL SULFATE SCH ML: .5; 3 SOLUTION RESPIRATORY (INHALATION) at 13:29

## 2019-01-26 RX ADMIN — METHYLPREDNISOLONE SODIUM SUCCINATE SCH MG: 40 INJECTION, POWDER, FOR SOLUTION INTRAMUSCULAR; INTRAVENOUS at 14:01

## 2019-01-26 RX ADMIN — INSULIN ASPART SCH UNIT: 100 INJECTION, SOLUTION INTRAVENOUS; SUBCUTANEOUS at 14:05

## 2019-01-26 RX ADMIN — INSULIN GLARGINE SCH UNITS: 100 INJECTION, SOLUTION SUBCUTANEOUS at 21:48

## 2019-01-26 RX ADMIN — INSULIN ASPART SCH UNIT: 100 INJECTION, SOLUTION INTRAVENOUS; SUBCUTANEOUS at 17:26

## 2019-01-26 RX ADMIN — INSULIN ASPART SCH: 100 INJECTION, SOLUTION INTRAVENOUS; SUBCUTANEOUS at 17:29

## 2019-01-26 RX ADMIN — IPRATROPIUM BROMIDE AND ALBUTEROL SULFATE SCH ML: .5; 3 SOLUTION RESPIRATORY (INHALATION) at 19:45

## 2019-01-26 RX ADMIN — Medication SCH TAB: at 10:38

## 2019-01-26 RX ADMIN — IPRATROPIUM BROMIDE AND ALBUTEROL SULFATE SCH: .5; 3 SOLUTION RESPIRATORY (INHALATION) at 07:51

## 2019-01-26 RX ADMIN — IPRATROPIUM BROMIDE AND ALBUTEROL SULFATE SCH: .5; 3 SOLUTION RESPIRATORY (INHALATION) at 02:00

## 2019-01-27 RX ADMIN — ENOXAPARIN SODIUM SCH MG: 40 INJECTION SUBCUTANEOUS at 09:01

## 2019-01-27 RX ADMIN — IPRATROPIUM BROMIDE AND ALBUTEROL SULFATE SCH: .5; 3 SOLUTION RESPIRATORY (INHALATION) at 08:24

## 2019-01-27 RX ADMIN — INSULIN GLARGINE SCH UNITS: 100 INJECTION, SOLUTION SUBCUTANEOUS at 21:59

## 2019-01-27 RX ADMIN — IPRATROPIUM BROMIDE AND ALBUTEROL SULFATE SCH: .5; 3 SOLUTION RESPIRATORY (INHALATION) at 01:05

## 2019-01-27 RX ADMIN — IPRATROPIUM BROMIDE AND ALBUTEROL SULFATE SCH ML: .5; 3 SOLUTION RESPIRATORY (INHALATION) at 14:11

## 2019-01-27 RX ADMIN — METHYLPREDNISOLONE SODIUM SUCCINATE SCH MG: 40 INJECTION, POWDER, FOR SOLUTION INTRAMUSCULAR; INTRAVENOUS at 13:29

## 2019-01-27 RX ADMIN — INSULIN ASPART SCH UNITS: 100 INJECTION, SOLUTION INTRAVENOUS; SUBCUTANEOUS at 21:59

## 2019-01-27 RX ADMIN — Medication SCH TAB: at 09:05

## 2019-01-27 RX ADMIN — METHYLPREDNISOLONE SODIUM SUCCINATE SCH MG: 40 INJECTION, POWDER, FOR SOLUTION INTRAMUSCULAR; INTRAVENOUS at 22:00

## 2019-01-27 RX ADMIN — INSULIN ASPART SCH UNITS: 100 INJECTION, SOLUTION INTRAVENOUS; SUBCUTANEOUS at 12:46

## 2019-01-27 RX ADMIN — INSULIN ASPART SCH UNITS: 100 INJECTION, SOLUTION INTRAVENOUS; SUBCUTANEOUS at 08:57

## 2019-01-27 RX ADMIN — IPRATROPIUM BROMIDE AND ALBUTEROL SULFATE SCH ML: .5; 3 SOLUTION RESPIRATORY (INHALATION) at 19:43

## 2019-01-27 RX ADMIN — INSULIN ASPART SCH UNITS: 100 INJECTION, SOLUTION INTRAVENOUS; SUBCUTANEOUS at 17:06

## 2019-01-27 RX ADMIN — PANTOPRAZOLE SODIUM SCH MG: 40 TABLET, DELAYED RELEASE ORAL at 09:04

## 2019-01-27 RX ADMIN — METHYLPREDNISOLONE SODIUM SUCCINATE SCH MG: 40 INJECTION, POWDER, FOR SOLUTION INTRAMUSCULAR; INTRAVENOUS at 05:05

## 2019-01-28 VITALS — RESPIRATION RATE: 20 BRPM

## 2019-01-28 RX ADMIN — IPRATROPIUM BROMIDE AND ALBUTEROL SULFATE SCH: .5; 3 SOLUTION RESPIRATORY (INHALATION) at 01:58

## 2019-01-28 RX ADMIN — METHYLPREDNISOLONE SODIUM SUCCINATE SCH MG: 40 INJECTION, POWDER, FOR SOLUTION INTRAMUSCULAR; INTRAVENOUS at 21:26

## 2019-01-28 RX ADMIN — INSULIN ASPART SCH UNITS: 100 INJECTION, SOLUTION INTRAVENOUS; SUBCUTANEOUS at 21:24

## 2019-01-28 RX ADMIN — IPRATROPIUM BROMIDE AND ALBUTEROL SULFATE SCH ML: .5; 3 SOLUTION RESPIRATORY (INHALATION) at 19:34

## 2019-01-28 RX ADMIN — INSULIN GLARGINE SCH UNITS: 100 INJECTION, SOLUTION SUBCUTANEOUS at 21:25

## 2019-01-28 RX ADMIN — METHYLPREDNISOLONE SODIUM SUCCINATE SCH MG: 40 INJECTION, POWDER, FOR SOLUTION INTRAMUSCULAR; INTRAVENOUS at 05:16

## 2019-01-28 RX ADMIN — IPRATROPIUM BROMIDE AND ALBUTEROL SULFATE SCH: .5; 3 SOLUTION RESPIRATORY (INHALATION) at 13:26

## 2019-01-28 RX ADMIN — PANTOPRAZOLE SODIUM SCH MG: 40 TABLET, DELAYED RELEASE ORAL at 10:14

## 2019-01-28 RX ADMIN — IPRATROPIUM BROMIDE AND ALBUTEROL SULFATE SCH: .5; 3 SOLUTION RESPIRATORY (INHALATION) at 07:20

## 2019-01-28 RX ADMIN — INSULIN ASPART SCH UNITS: 100 INJECTION, SOLUTION INTRAVENOUS; SUBCUTANEOUS at 10:09

## 2019-01-28 RX ADMIN — METHYLPREDNISOLONE SODIUM SUCCINATE SCH MG: 40 INJECTION, POWDER, FOR SOLUTION INTRAMUSCULAR; INTRAVENOUS at 14:24

## 2019-01-28 RX ADMIN — Medication SCH TAB: at 10:12

## 2019-01-28 RX ADMIN — ENOXAPARIN SODIUM SCH MG: 40 INJECTION SUBCUTANEOUS at 10:10

## 2019-01-28 RX ADMIN — INSULIN ASPART SCH UNITS: 100 INJECTION, SOLUTION INTRAVENOUS; SUBCUTANEOUS at 02:26

## 2019-01-28 RX ADMIN — INSULIN ASPART SCH UNITS: 100 INJECTION, SOLUTION INTRAVENOUS; SUBCUTANEOUS at 17:27

## 2019-01-28 RX ADMIN — INSULIN ASPART SCH UNITS: 100 INJECTION, SOLUTION INTRAVENOUS; SUBCUTANEOUS at 12:15

## 2019-01-29 VITALS — TEMPERATURE: 97.3 F | SYSTOLIC BLOOD PRESSURE: 160 MMHG | DIASTOLIC BLOOD PRESSURE: 95 MMHG | OXYGEN SATURATION: 95 %

## 2019-01-29 VITALS — HEART RATE: 95 BPM

## 2019-01-29 RX ADMIN — IPRATROPIUM BROMIDE AND ALBUTEROL SULFATE SCH: .5; 3 SOLUTION RESPIRATORY (INHALATION) at 01:31

## 2019-01-29 RX ADMIN — Medication SCH TAB: at 08:15

## 2019-01-29 RX ADMIN — METHYLPREDNISOLONE SODIUM SUCCINATE SCH MG: 40 INJECTION, POWDER, FOR SOLUTION INTRAMUSCULAR; INTRAVENOUS at 05:30

## 2019-01-29 RX ADMIN — PANTOPRAZOLE SODIUM SCH MG: 40 TABLET, DELAYED RELEASE ORAL at 10:16

## 2019-01-29 RX ADMIN — INSULIN ASPART SCH UNITS: 100 INJECTION, SOLUTION INTRAVENOUS; SUBCUTANEOUS at 08:15

## 2019-01-29 RX ADMIN — INSULIN ASPART SCH UNITS: 100 INJECTION, SOLUTION INTRAVENOUS; SUBCUTANEOUS at 12:49

## 2019-01-29 RX ADMIN — INSULIN ASPART SCH: 100 INJECTION, SOLUTION INTRAVENOUS; SUBCUTANEOUS at 12:49

## 2019-01-29 RX ADMIN — ENOXAPARIN SODIUM SCH MG: 40 INJECTION SUBCUTANEOUS at 10:17

## 2019-01-29 RX ADMIN — IPRATROPIUM BROMIDE AND ALBUTEROL SULFATE SCH ML: .5; 3 SOLUTION RESPIRATORY (INHALATION) at 07:42

## 2019-01-29 RX ADMIN — INSULIN ASPART SCH: 100 INJECTION, SOLUTION INTRAVENOUS; SUBCUTANEOUS at 08:20

## 2019-02-26 ENCOUNTER — HOSPITAL ENCOUNTER (INPATIENT)
Dept: HOSPITAL 31 - C.ER | Age: 54
LOS: 3 days | Discharge: HOME | DRG: 885 | End: 2019-03-01
Attending: PSYCHIATRY & NEUROLOGY | Admitting: PSYCHIATRY & NEUROLOGY
Payer: COMMERCIAL

## 2019-02-26 VITALS — OXYGEN SATURATION: 94 %

## 2019-02-26 VITALS — TEMPERATURE: 98.7 F

## 2019-02-26 VITALS — BODY MASS INDEX: 33.5 KG/M2

## 2019-02-26 DIAGNOSIS — E78.00: ICD-10-CM

## 2019-02-26 DIAGNOSIS — F25.0: Primary | ICD-10-CM

## 2019-02-26 DIAGNOSIS — F10.239: ICD-10-CM

## 2019-02-26 DIAGNOSIS — I10: ICD-10-CM

## 2019-02-26 DIAGNOSIS — J44.9: ICD-10-CM

## 2019-02-26 DIAGNOSIS — Z79.84: ICD-10-CM

## 2019-02-26 DIAGNOSIS — G47.00: ICD-10-CM

## 2019-02-26 DIAGNOSIS — R45.851: ICD-10-CM

## 2019-02-26 DIAGNOSIS — F41.9: ICD-10-CM

## 2019-02-26 DIAGNOSIS — Z87.891: ICD-10-CM

## 2019-02-26 DIAGNOSIS — E11.9: ICD-10-CM

## 2019-02-26 DIAGNOSIS — Z91.5: ICD-10-CM

## 2019-02-26 DIAGNOSIS — G47.30: ICD-10-CM

## 2019-02-26 LAB
ALBUMIN SERPL-MCNC: 4.6 G/DL (ref 3.5–5)
ALBUMIN/GLOB SERPL: 1.9 {RATIO} (ref 1–2.1)
ALT SERPL-CCNC: 53 U/L (ref 21–72)
AST SERPL-CCNC: 51 U/L (ref 17–59)
BASOPHILS # BLD AUTO: 0 K/UL (ref 0–0.2)
BASOPHILS NFR BLD: 0.6 % (ref 0–2)
BILIRUB UR-MCNC: NEGATIVE MG/DL
BUN SERPL-MCNC: 8 MG/DL (ref 9–20)
CALCIUM SERPL-MCNC: 9.2 MG/DL (ref 8.6–10.4)
EOSINOPHIL # BLD AUTO: 0.1 K/UL (ref 0–0.7)
EOSINOPHIL NFR BLD: 1.3 % (ref 0–4)
ERYTHROCYTE [DISTWIDTH] IN BLOOD BY AUTOMATED COUNT: 15.4 % (ref 11.5–14.5)
GFR NON-AFRICAN AMERICAN: > 60
GLUCOSE UR STRIP-MCNC: (no result) MG/DL
HGB BLD-MCNC: 14.9 G/DL (ref 12–18)
LEUKOCYTE ESTERASE UR-ACNC: (no result) LEU/UL
LYMPHOCYTES # BLD AUTO: 2.4 K/UL (ref 1–4.3)
LYMPHOCYTES NFR BLD AUTO: 36.9 % (ref 20–40)
MCH RBC QN AUTO: 30.9 PG (ref 27–31)
MCHC RBC AUTO-ENTMCNC: 33 G/DL (ref 33–37)
MCV RBC AUTO: 93.7 FL (ref 80–94)
MONOCYTES # BLD: 0.5 K/UL (ref 0–0.8)
MONOCYTES NFR BLD: 7 % (ref 0–10)
NEUTROPHILS # BLD: 3.6 K/UL (ref 1.8–7)
NEUTROPHILS NFR BLD AUTO: 54.2 % (ref 50–75)
NRBC BLD AUTO-RTO: 0 % (ref 0–2)
PH UR STRIP: 6 [PH] (ref 5–8)
PLATELET # BLD: 307 K/UL (ref 130–400)
PMV BLD AUTO: 7.8 FL (ref 7.2–11.7)
PROT UR STRIP-MCNC: (no result) MG/DL
RBC # BLD AUTO: 4.82 MIL/UL (ref 4.4–5.9)
RBC # UR STRIP: NEGATIVE /UL
SP GR UR STRIP: 1.01 (ref 1–1.03)
SQUAMOUS EPITHIAL: < 1 /HPF (ref 0–5)
UROBILINOGEN UR-MCNC: NORMAL MG/DL (ref 0.2–1)
WBC # BLD AUTO: 6.6 K/UL (ref 4.8–10.8)

## 2019-02-26 PROCEDURE — GZHZZZZ GROUP PSYCHOTHERAPY: ICD-10-PCS | Performed by: PSYCHIATRY & NEUROLOGY

## 2019-02-26 PROCEDURE — GZ3ZZZZ MEDICATION MANAGEMENT: ICD-10-PCS | Performed by: PSYCHIATRY & NEUROLOGY

## 2019-02-26 PROCEDURE — GZ56ZZZ INDIVIDUAL PSYCHOTHERAPY, SUPPORTIVE: ICD-10-PCS | Performed by: PSYCHIATRY & NEUROLOGY

## 2019-02-26 PROCEDURE — HZ89ZZZ MEDICATION MANAGEMENT FOR SUBSTANCE ABUSE TREATMENT, OTHER REPLACEMENT MEDICATION: ICD-10-PCS | Performed by: PSYCHIATRY & NEUROLOGY

## 2019-02-26 RX ADMIN — PANTOPRAZOLE SODIUM SCH MG: 40 TABLET, DELAYED RELEASE ORAL at 17:50

## 2019-02-26 RX ADMIN — INSULIN ASPART SCH UNITS: 100 INJECTION, SOLUTION INTRAVENOUS; SUBCUTANEOUS at 21:39

## 2019-02-26 RX ADMIN — Medication SCH TAB: at 19:25

## 2019-02-26 RX ADMIN — INSULIN GLARGINE SCH UNITS: 100 INJECTION, SOLUTION SUBCUTANEOUS at 21:40

## 2019-02-26 RX ADMIN — Medication SCH MG: at 19:25

## 2019-02-27 RX ADMIN — INSULIN GLARGINE SCH UNITS: 100 INJECTION, SOLUTION SUBCUTANEOUS at 21:25

## 2019-02-27 RX ADMIN — Medication SCH MG: at 17:59

## 2019-02-27 RX ADMIN — PANTOPRAZOLE SODIUM SCH MG: 40 TABLET, DELAYED RELEASE ORAL at 09:13

## 2019-02-27 RX ADMIN — INSULIN ASPART SCH UNITS: 100 INJECTION, SOLUTION INTRAVENOUS; SUBCUTANEOUS at 11:21

## 2019-02-27 RX ADMIN — Medication SCH MG: at 09:13

## 2019-02-27 RX ADMIN — INSULIN ASPART SCH UNITS: 100 INJECTION, SOLUTION INTRAVENOUS; SUBCUTANEOUS at 08:29

## 2019-02-27 RX ADMIN — INSULIN ASPART SCH: 100 INJECTION, SOLUTION INTRAVENOUS; SUBCUTANEOUS at 21:28

## 2019-02-27 RX ADMIN — Medication SCH TAB: at 09:13

## 2019-02-27 RX ADMIN — INSULIN ASPART SCH UNITS: 100 INJECTION, SOLUTION INTRAVENOUS; SUBCUTANEOUS at 16:27

## 2019-02-28 VITALS — RESPIRATION RATE: 20 BRPM

## 2019-02-28 VITALS — SYSTOLIC BLOOD PRESSURE: 139 MMHG | DIASTOLIC BLOOD PRESSURE: 89 MMHG | HEART RATE: 76 BPM

## 2019-02-28 RX ADMIN — Medication SCH TAB: at 09:30

## 2019-02-28 RX ADMIN — Medication SCH MG: at 17:15

## 2019-02-28 RX ADMIN — INSULIN ASPART SCH UNITS: 100 INJECTION, SOLUTION INTRAVENOUS; SUBCUTANEOUS at 16:48

## 2019-02-28 RX ADMIN — INSULIN GLARGINE SCH UNITS: 100 INJECTION, SOLUTION SUBCUTANEOUS at 21:52

## 2019-02-28 RX ADMIN — PANTOPRAZOLE SODIUM SCH MG: 40 TABLET, DELAYED RELEASE ORAL at 09:30

## 2019-02-28 RX ADMIN — INSULIN ASPART SCH UNITS: 100 INJECTION, SOLUTION INTRAVENOUS; SUBCUTANEOUS at 08:49

## 2019-02-28 RX ADMIN — Medication SCH MG: at 10:09

## 2019-02-28 RX ADMIN — INSULIN ASPART SCH UNITS: 100 INJECTION, SOLUTION INTRAVENOUS; SUBCUTANEOUS at 12:19

## 2019-02-28 RX ADMIN — INSULIN ASPART SCH: 100 INJECTION, SOLUTION INTRAVENOUS; SUBCUTANEOUS at 22:02

## 2019-03-01 RX ADMIN — Medication SCH MG: at 09:30

## 2019-03-01 RX ADMIN — Medication SCH TAB: at 09:29

## 2019-03-01 RX ADMIN — PANTOPRAZOLE SODIUM SCH MG: 40 TABLET, DELAYED RELEASE ORAL at 09:28

## 2019-03-01 RX ADMIN — INSULIN ASPART SCH UNITS: 100 INJECTION, SOLUTION INTRAVENOUS; SUBCUTANEOUS at 08:29

## 2019-03-27 ENCOUNTER — HOSPITAL ENCOUNTER (INPATIENT)
Dept: HOSPITAL 31 - C.ER | Age: 54
LOS: 4 days | Discharge: LEFT BEFORE DISCHARGE | DRG: 885 | End: 2019-03-31
Attending: PSYCHIATRY & NEUROLOGY | Admitting: PSYCHIATRY & NEUROLOGY
Payer: COMMERCIAL

## 2019-03-27 VITALS — OXYGEN SATURATION: 95 %

## 2019-03-27 VITALS — BODY MASS INDEX: 33.5 KG/M2

## 2019-03-27 DIAGNOSIS — I10: ICD-10-CM

## 2019-03-27 DIAGNOSIS — G47.30: ICD-10-CM

## 2019-03-27 DIAGNOSIS — E11.9: ICD-10-CM

## 2019-03-27 DIAGNOSIS — F10.220: ICD-10-CM

## 2019-03-27 DIAGNOSIS — F10.230: ICD-10-CM

## 2019-03-27 DIAGNOSIS — Z79.4: ICD-10-CM

## 2019-03-27 DIAGNOSIS — G47.00: ICD-10-CM

## 2019-03-27 DIAGNOSIS — E78.00: ICD-10-CM

## 2019-03-27 DIAGNOSIS — F41.9: ICD-10-CM

## 2019-03-27 DIAGNOSIS — F25.0: Primary | ICD-10-CM

## 2019-03-27 DIAGNOSIS — J44.9: ICD-10-CM
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
## 2019-03-27 DIAGNOSIS — R45.851: ICD-10-CM

## 2019-03-27 DIAGNOSIS — Z91.5: ICD-10-CM

## 2019-03-27 LAB
ALBUMIN SERPL-MCNC: 4.4 G/DL (ref 3.5–5)
ALBUMIN/GLOB SERPL: 1.8 {RATIO} (ref 1–2.1)
ALT SERPL-CCNC: 35 U/L (ref 21–72)
AST SERPL-CCNC: 40 U/L (ref 17–59)
BASOPHILS # BLD AUTO: 0.1 K/UL (ref 0–0.2)
BASOPHILS NFR BLD: 0.7 % (ref 0–2)
BILIRUB UR-MCNC: NEGATIVE MG/DL
BUN SERPL-MCNC: 11 MG/DL (ref 9–20)
CALCIUM SERPL-MCNC: 10.2 MG/DL (ref 8.6–10.4)
EOSINOPHIL # BLD AUTO: 0.2 K/UL (ref 0–0.7)
EOSINOPHIL NFR BLD: 2.2 % (ref 0–4)
ERYTHROCYTE [DISTWIDTH] IN BLOOD BY AUTOMATED COUNT: 15.2 % (ref 11.5–14.5)
GFR NON-AFRICAN AMERICAN: > 60
GLUCOSE UR STRIP-MCNC: NORMAL MG/DL
HGB BLD-MCNC: 14.3 G/DL (ref 12–18)
LEUKOCYTE ESTERASE UR-ACNC: (no result) LEU/UL
LYMPHOCYTES # BLD AUTO: 2.5 K/UL (ref 1–4.3)
LYMPHOCYTES NFR BLD AUTO: 27 % (ref 20–40)
MCH RBC QN AUTO: 30.5 PG (ref 27–31)
MCHC RBC AUTO-ENTMCNC: 33.6 G/DL (ref 33–37)
MCV RBC AUTO: 90.8 FL (ref 80–94)
MONOCYTES # BLD: 0.4 K/UL (ref 0–0.8)
MONOCYTES NFR BLD: 4.1 % (ref 0–10)
NEUTROPHILS # BLD: 6 K/UL (ref 1.8–7)
NEUTROPHILS NFR BLD AUTO: 66 % (ref 50–75)
NRBC BLD AUTO-RTO: 0.1 % (ref 0–2)
PH UR STRIP: 6 [PH] (ref 5–8)
PLATELET # BLD: 295 K/UL (ref 130–400)
PMV BLD AUTO: 8.3 FL (ref 7.2–11.7)
PROT UR STRIP-MCNC: NEGATIVE MG/DL
RBC # BLD AUTO: 4.68 MIL/UL (ref 4.4–5.9)
RBC # UR STRIP: NEGATIVE /UL
SP GR UR STRIP: 1 (ref 1–1.03)
SQUAMOUS EPITHIAL: < 1 /HPF (ref 0–5)
UROBILINOGEN UR-MCNC: NORMAL MG/DL (ref 0.2–1)
WBC # BLD AUTO: 9.1 K/UL (ref 4.8–10.8)

## 2019-03-27 PROCEDURE — GZHZZZZ GROUP PSYCHOTHERAPY: ICD-10-PCS | Performed by: PSYCHIATRY & NEUROLOGY

## 2019-03-27 PROCEDURE — HZ52ZZZ INDIVIDUAL PSYCHOTHERAPY FOR SUBSTANCE ABUSE TREATMENT, COGNITIVE-BEHAVIORAL: ICD-10-PCS | Performed by: PSYCHIATRY & NEUROLOGY

## 2019-03-27 PROCEDURE — HZ42ZZZ GROUP COUNSELING FOR SUBSTANCE ABUSE TREATMENT, COGNITIVE-BEHAVIORAL: ICD-10-PCS | Performed by: PSYCHIATRY & NEUROLOGY

## 2019-03-27 PROCEDURE — HZ2ZZZZ DETOXIFICATION SERVICES FOR SUBSTANCE ABUSE TREATMENT: ICD-10-PCS | Performed by: PSYCHIATRY & NEUROLOGY

## 2019-03-27 PROCEDURE — GZ56ZZZ INDIVIDUAL PSYCHOTHERAPY, SUPPORTIVE: ICD-10-PCS | Performed by: PSYCHIATRY & NEUROLOGY

## 2019-03-27 PROCEDURE — HZ59ZZZ INDIVIDUAL PSYCHOTHERAPY FOR SUBSTANCE ABUSE TREATMENT, SUPPORTIVE: ICD-10-PCS | Performed by: PSYCHIATRY & NEUROLOGY

## 2019-03-27 PROCEDURE — HZ46ZZZ GROUP COUNSELING FOR SUBSTANCE ABUSE TREATMENT, PSYCHOEDUCATION: ICD-10-PCS | Performed by: PSYCHIATRY & NEUROLOGY

## 2019-03-27 PROCEDURE — GZ58ZZZ INDIVIDUAL PSYCHOTHERAPY, COGNITIVE-BEHAVIORAL: ICD-10-PCS | Performed by: PSYCHIATRY & NEUROLOGY

## 2019-03-27 PROCEDURE — HZ56ZZZ INDIVIDUAL PSYCHOTHERAPY FOR SUBSTANCE ABUSE TREATMENT, PSYCHOEDUCATION: ICD-10-PCS | Performed by: PSYCHIATRY & NEUROLOGY

## 2019-03-27 RX ADMIN — INSULIN GLARGINE SCH UNITS: 100 INJECTION, SOLUTION SUBCUTANEOUS at 21:34

## 2019-03-28 RX ADMIN — INSULIN GLARGINE SCH UNITS: 100 INJECTION, SOLUTION SUBCUTANEOUS at 10:09

## 2019-03-28 RX ADMIN — INSULIN GLARGINE SCH UNITS: 100 INJECTION, SOLUTION SUBCUTANEOUS at 21:28

## 2019-03-28 RX ADMIN — PANTOPRAZOLE SODIUM SCH MG: 40 TABLET, DELAYED RELEASE ORAL at 10:09

## 2019-03-29 VITALS — RESPIRATION RATE: 18 BRPM | TEMPERATURE: 98.2 F

## 2019-03-29 RX ADMIN — INSULIN GLARGINE SCH UNITS: 100 INJECTION, SOLUTION SUBCUTANEOUS at 09:18

## 2019-03-29 RX ADMIN — PANTOPRAZOLE SODIUM SCH MG: 40 TABLET, DELAYED RELEASE ORAL at 09:19

## 2019-03-29 RX ADMIN — INSULIN GLARGINE SCH UNITS: 100 INJECTION, SOLUTION SUBCUTANEOUS at 22:20

## 2019-03-30 VITALS — HEART RATE: 97 BPM | DIASTOLIC BLOOD PRESSURE: 87 MMHG | SYSTOLIC BLOOD PRESSURE: 128 MMHG

## 2019-03-30 RX ADMIN — PANTOPRAZOLE SODIUM SCH MG: 40 TABLET, DELAYED RELEASE ORAL at 09:49

## 2019-03-30 RX ADMIN — INSULIN GLARGINE SCH UNITS: 100 INJECTION, SOLUTION SUBCUTANEOUS at 21:53

## 2019-03-30 RX ADMIN — INSULIN GLARGINE SCH UNITS: 100 INJECTION, SOLUTION SUBCUTANEOUS at 09:48

## 2019-03-31 RX ADMIN — INSULIN GLARGINE SCH UNITS: 100 INJECTION, SOLUTION SUBCUTANEOUS at 09:01

## 2019-03-31 RX ADMIN — PANTOPRAZOLE SODIUM SCH MG: 40 TABLET, DELAYED RELEASE ORAL at 09:03

## 2019-04-19 ENCOUNTER — HOSPITAL ENCOUNTER (EMERGENCY)
Dept: HOSPITAL 31 - C.ER | Age: 54
Discharge: HOME | End: 2019-04-19
Payer: COMMERCIAL

## 2019-04-19 VITALS
HEART RATE: 82 BPM | DIASTOLIC BLOOD PRESSURE: 82 MMHG | OXYGEN SATURATION: 100 % | TEMPERATURE: 98.8 F | SYSTOLIC BLOOD PRESSURE: 110 MMHG

## 2019-04-19 VITALS — RESPIRATION RATE: 18 BRPM

## 2019-04-19 VITALS — BODY MASS INDEX: 33.5 KG/M2

## 2019-04-19 DIAGNOSIS — F17.210: ICD-10-CM

## 2019-04-19 DIAGNOSIS — E11.9: ICD-10-CM
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
## 2019-04-19 DIAGNOSIS — F10.129: Primary | ICD-10-CM

## 2019-04-19 DIAGNOSIS — I10: ICD-10-CM

## 2019-04-19 DIAGNOSIS — F41.9: ICD-10-CM

## 2019-04-19 DIAGNOSIS — F31.9: ICD-10-CM

## 2019-04-19 LAB
ALBUMIN SERPL-MCNC: 4.6 G/DL (ref 3.5–5)
ALBUMIN/GLOB SERPL: 1.7 {RATIO} (ref 1–2.1)
ALT SERPL-CCNC: 48 U/L (ref 21–72)
AST SERPL-CCNC: 35 U/L (ref 17–59)
BASOPHILS # BLD AUTO: 0 K/UL (ref 0–0.2)
BASOPHILS NFR BLD: 0.4 % (ref 0–2)
BILIRUB UR-MCNC: NEGATIVE MG/DL
BUN SERPL-MCNC: 12 MG/DL (ref 9–20)
CALCIUM SERPL-MCNC: 9.4 MG/DL (ref 8.6–10.4)
EOSINOPHIL # BLD AUTO: 0.2 K/UL (ref 0–0.7)
EOSINOPHIL NFR BLD: 1.9 % (ref 0–4)
ERYTHROCYTE [DISTWIDTH] IN BLOOD BY AUTOMATED COUNT: 16.2 % (ref 11.5–14.5)
GFR NON-AFRICAN AMERICAN: > 60
GLUCOSE UR STRIP-MCNC: NORMAL MG/DL
HGB BLD-MCNC: 13.7 G/DL (ref 12–18)
LEUKOCYTE ESTERASE UR-ACNC: (no result) LEU/UL
LYMPHOCYTES # BLD AUTO: 2.4 K/UL (ref 1–4.3)
LYMPHOCYTES NFR BLD AUTO: 27.8 % (ref 20–40)
MCH RBC QN AUTO: 30 PG (ref 27–31)
MCHC RBC AUTO-ENTMCNC: 33.7 G/DL (ref 33–37)
MCV RBC AUTO: 89.1 FL (ref 80–94)
MONOCYTES # BLD: 0.6 K/UL (ref 0–0.8)
MONOCYTES NFR BLD: 6.4 % (ref 0–10)
NEUTROPHILS # BLD: 5.6 K/UL (ref 1.8–7)
NEUTROPHILS NFR BLD AUTO: 63.5 % (ref 50–75)
NRBC BLD AUTO-RTO: 0.1 % (ref 0–2)
PH UR STRIP: 6 [PH] (ref 5–8)
PLATELET # BLD: 292 K/UL (ref 130–400)
PMV BLD AUTO: 7.7 FL (ref 7.2–11.7)
PROT UR STRIP-MCNC: NEGATIVE MG/DL
RBC # BLD AUTO: 4.58 MIL/UL (ref 4.4–5.9)
RBC # UR STRIP: NEGATIVE /UL
SP GR UR STRIP: 1 (ref 1–1.03)
UROBILINOGEN UR-MCNC: NORMAL MG/DL (ref 0.2–1)
WBC # BLD AUTO: 8.8 K/UL (ref 4.8–10.8)

## 2019-04-19 PROCEDURE — 83735 ASSAY OF MAGNESIUM: CPT

## 2019-04-19 PROCEDURE — 85025 COMPLETE CBC W/AUTO DIFF WBC: CPT

## 2019-04-19 PROCEDURE — 80053 COMPREHEN METABOLIC PANEL: CPT

## 2019-04-19 PROCEDURE — 81001 URINALYSIS AUTO W/SCOPE: CPT

## 2019-04-19 PROCEDURE — 84100 ASSAY OF PHOSPHORUS: CPT

## 2019-04-19 PROCEDURE — 82948 REAGENT STRIP/BLOOD GLUCOSE: CPT

## 2019-04-19 PROCEDURE — 99283 EMERGENCY DEPT VISIT LOW MDM: CPT

## 2019-05-01 ENCOUNTER — HOSPITAL ENCOUNTER (EMERGENCY)
Dept: HOSPITAL 31 - C.ER | Age: 54
Discharge: LEFT BEFORE DISCHARGE | End: 2019-05-01
Payer: COMMERCIAL

## 2019-05-01 VITALS
HEART RATE: 92 BPM | TEMPERATURE: 98.7 F | SYSTOLIC BLOOD PRESSURE: 130 MMHG | DIASTOLIC BLOOD PRESSURE: 70 MMHG | OXYGEN SATURATION: 93 % | RESPIRATION RATE: 18 BRPM

## 2019-05-01 VITALS — BODY MASS INDEX: 33.5 KG/M2
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
## 2019-05-01 DIAGNOSIS — F10.129: Primary | ICD-10-CM
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
## 2019-05-01 DIAGNOSIS — S00.81XA: ICD-10-CM

## 2019-05-01 LAB
ALBUMIN SERPL-MCNC: 4.6 G/DL (ref 3.5–5)
ALBUMIN/GLOB SERPL: 1.6 {RATIO} (ref 1–2.1)
ALT SERPL-CCNC: 57 U/L (ref 21–72)
AST SERPL-CCNC: 57 U/L (ref 17–59)
BASOPHILS # BLD AUTO: 0.1 K/UL (ref 0–0.2)
BASOPHILS NFR BLD: 1.1 % (ref 0–2)
BILIRUB UR-MCNC: NEGATIVE MG/DL
BUN SERPL-MCNC: 10 MG/DL (ref 9–20)
CALCIUM SERPL-MCNC: 9.6 MG/DL (ref 8.6–10.4)
EOSINOPHIL # BLD AUTO: 0.2 K/UL (ref 0–0.7)
EOSINOPHIL NFR BLD: 3 % (ref 0–4)
ERYTHROCYTE [DISTWIDTH] IN BLOOD BY AUTOMATED COUNT: 16.5 % (ref 11.5–14.5)
GFR NON-AFRICAN AMERICAN: > 60
GLUCOSE UR STRIP-MCNC: NORMAL MG/DL
HGB BLD-MCNC: 14.5 G/DL (ref 12–18)
LEUKOCYTE ESTERASE UR-ACNC: (no result) LEU/UL
LYMPHOCYTES # BLD AUTO: 2.4 K/UL (ref 1–4.3)
LYMPHOCYTES NFR BLD AUTO: 34.6 % (ref 20–40)
MCH RBC QN AUTO: 29.6 PG (ref 27–31)
MCHC RBC AUTO-ENTMCNC: 33.4 G/DL (ref 33–37)
MCV RBC AUTO: 88.7 FL (ref 80–94)
MONOCYTES # BLD: 0.8 K/UL (ref 0–0.8)
MONOCYTES NFR BLD: 11.1 % (ref 0–10)
NEUTROPHILS # BLD: 3.4 K/UL (ref 1.8–7)
NEUTROPHILS NFR BLD AUTO: 50.2 % (ref 50–75)
NRBC BLD AUTO-RTO: 0.1 % (ref 0–2)
PH UR STRIP: 5 [PH] (ref 5–8)
PLATELET # BLD: 252 K/UL (ref 130–400)
PMV BLD AUTO: 7.7 FL (ref 7.2–11.7)
PROT UR STRIP-MCNC: NEGATIVE MG/DL
RBC # BLD AUTO: 4.88 MIL/UL (ref 4.4–5.9)
RBC # UR STRIP: NEGATIVE /UL
SP GR UR STRIP: 1 (ref 1–1.03)
SQUAMOUS EPITHIAL: < 1 /HPF (ref 0–5)
UROBILINOGEN UR-MCNC: NORMAL MG/DL (ref 0.2–1)
WBC # BLD AUTO: 6.8 K/UL (ref 4.8–10.8)

## 2019-05-01 PROCEDURE — 81001 URINALYSIS AUTO W/SCOPE: CPT

## 2019-05-01 PROCEDURE — 99284 EMERGENCY DEPT VISIT MOD MDM: CPT

## 2019-05-01 PROCEDURE — 70450 CT HEAD/BRAIN W/O DYE: CPT

## 2019-05-01 PROCEDURE — 82948 REAGENT STRIP/BLOOD GLUCOSE: CPT

## 2019-05-01 PROCEDURE — 84100 ASSAY OF PHOSPHORUS: CPT

## 2019-05-01 PROCEDURE — 80053 COMPREHEN METABOLIC PANEL: CPT

## 2019-05-01 PROCEDURE — 85025 COMPLETE CBC W/AUTO DIFF WBC: CPT

## 2019-05-01 PROCEDURE — 83735 ASSAY OF MAGNESIUM: CPT

## 2019-05-29 ENCOUNTER — HOSPITAL ENCOUNTER (EMERGENCY)
Dept: HOSPITAL 31 - C.ER | Age: 54
Discharge: HOME | End: 2019-05-29
Payer: COMMERCIAL

## 2019-05-29 VITALS — SYSTOLIC BLOOD PRESSURE: 112 MMHG | OXYGEN SATURATION: 99 % | DIASTOLIC BLOOD PRESSURE: 76 MMHG | TEMPERATURE: 98.2 F

## 2019-05-29 VITALS — BODY MASS INDEX: 33.5 KG/M2

## 2019-05-29 VITALS — HEART RATE: 76 BPM | RESPIRATION RATE: 18 BRPM
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
## 2019-05-29 DIAGNOSIS — F10.129: Primary | ICD-10-CM

## 2019-05-29 LAB
ALBUMIN SERPL-MCNC: 4.6 G/DL (ref 3.5–5)
ALBUMIN/GLOB SERPL: 1.4 {RATIO} (ref 1–2.1)
ALT SERPL-CCNC: 35 U/L (ref 21–72)
AST SERPL-CCNC: 65 U/L (ref 17–59)
BASOPHILS # BLD AUTO: 0 K/UL (ref 0–0.2)
BASOPHILS NFR BLD: 0.4 % (ref 0–2)
BILIRUB UR-MCNC: NEGATIVE MG/DL
BUN SERPL-MCNC: 11 MG/DL (ref 9–20)
CALCIUM SERPL-MCNC: 8.5 MG/DL (ref 8.6–10.4)
EOSINOPHIL # BLD AUTO: 0.1 K/UL (ref 0–0.7)
EOSINOPHIL NFR BLD: 1.4 % (ref 0–4)
ERYTHROCYTE [DISTWIDTH] IN BLOOD BY AUTOMATED COUNT: 16.6 % (ref 11.5–14.5)
GFR NON-AFRICAN AMERICAN: > 60
GLUCOSE UR STRIP-MCNC: NORMAL MG/DL
HGB BLD-MCNC: 14.3 G/DL (ref 12–18)
LEUKOCYTE ESTERASE UR-ACNC: (no result) LEU/UL
LYMPHOCYTES # BLD AUTO: 2 K/UL (ref 1–4.3)
LYMPHOCYTES NFR BLD AUTO: 26.2 % (ref 20–40)
MCH RBC QN AUTO: 29.6 PG (ref 27–31)
MCHC RBC AUTO-ENTMCNC: 33.8 G/DL (ref 33–37)
MCV RBC AUTO: 87.6 FL (ref 80–94)
MONOCYTES # BLD: 0.4 K/UL (ref 0–0.8)
MONOCYTES NFR BLD: 5.9 % (ref 0–10)
NEUTROPHILS # BLD: 5 K/UL (ref 1.8–7)
NEUTROPHILS NFR BLD AUTO: 66.1 % (ref 50–75)
NRBC BLD AUTO-RTO: 0.2 % (ref 0–2)
PH UR STRIP: 5 [PH] (ref 5–8)
PLATELET # BLD: 313 K/UL (ref 130–400)
PMV BLD AUTO: 8.2 FL (ref 7.2–11.7)
PROT UR STRIP-MCNC: NEGATIVE MG/DL
RBC # BLD AUTO: 4.84 MIL/UL (ref 4.4–5.9)
RBC # UR STRIP: NEGATIVE /UL
SP GR UR STRIP: 1.01 (ref 1–1.03)
SQUAMOUS EPITHIAL: < 1 /HPF (ref 0–5)
UROBILINOGEN UR-MCNC: NORMAL MG/DL (ref 0.2–1)
WBC # BLD AUTO: 7.6 K/UL (ref 4.8–10.8)

## 2019-05-29 PROCEDURE — 85025 COMPLETE CBC W/AUTO DIFF WBC: CPT

## 2019-05-29 PROCEDURE — 96372 THER/PROPH/DIAG INJ SC/IM: CPT

## 2019-05-29 PROCEDURE — 96365 THER/PROPH/DIAG IV INF INIT: CPT

## 2019-05-29 PROCEDURE — 99285 EMERGENCY DEPT VISIT HI MDM: CPT

## 2019-05-29 PROCEDURE — 84100 ASSAY OF PHOSPHORUS: CPT

## 2019-05-29 PROCEDURE — 82948 REAGENT STRIP/BLOOD GLUCOSE: CPT

## 2019-05-29 PROCEDURE — 96361 HYDRATE IV INFUSION ADD-ON: CPT

## 2019-05-29 PROCEDURE — 83735 ASSAY OF MAGNESIUM: CPT

## 2019-05-29 PROCEDURE — 81001 URINALYSIS AUTO W/SCOPE: CPT

## 2019-05-29 PROCEDURE — 80053 COMPREHEN METABOLIC PANEL: CPT

## 2019-05-31 ENCOUNTER — HOSPITAL ENCOUNTER (INPATIENT)
Dept: HOSPITAL 31 - C.ER | Age: 54
LOS: 3 days | Discharge: LEFT BEFORE DISCHARGE | DRG: 894 | End: 2019-06-03
Attending: INTERNAL MEDICINE | Admitting: INTERNAL MEDICINE
Payer: COMMERCIAL

## 2019-05-31 VITALS — RESPIRATION RATE: 20 BRPM

## 2019-05-31 VITALS — BODY MASS INDEX: 33.5 KG/M2

## 2019-05-31 DIAGNOSIS — F17.200: ICD-10-CM

## 2019-05-31 DIAGNOSIS — G47.30: ICD-10-CM

## 2019-05-31 DIAGNOSIS — R29.6: ICD-10-CM

## 2019-05-31 DIAGNOSIS — S80.212A: ICD-10-CM

## 2019-05-31 DIAGNOSIS — F10.229: ICD-10-CM

## 2019-05-31 DIAGNOSIS — Z87.01: ICD-10-CM
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
## 2019-05-31 DIAGNOSIS — I10: ICD-10-CM

## 2019-05-31 DIAGNOSIS — K70.9: ICD-10-CM

## 2019-05-31 DIAGNOSIS — J44.9: ICD-10-CM
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
## 2019-05-31 DIAGNOSIS — S80.211A: ICD-10-CM

## 2019-05-31 DIAGNOSIS — E86.0: ICD-10-CM

## 2019-05-31 DIAGNOSIS — M62.82: ICD-10-CM

## 2019-05-31 DIAGNOSIS — E11.649: ICD-10-CM

## 2019-05-31 DIAGNOSIS — E66.9: ICD-10-CM

## 2019-05-31 DIAGNOSIS — E78.00: ICD-10-CM

## 2019-05-31 DIAGNOSIS — S00.03XA: ICD-10-CM

## 2019-05-31 DIAGNOSIS — F10.231: Primary | ICD-10-CM

## 2019-05-31 DIAGNOSIS — F31.9: ICD-10-CM

## 2019-05-31 LAB
ALBUMIN SERPL-MCNC: 4.8 {NULL, G/DL} (ref 3.5–5)
ALBUMIN/GLOB SERPL: 1.6 {NULL, NULL} (ref 1–2.1)
ALT SERPL-CCNC: 49 {NULL, U/L} (ref 21–72)
APTT BLD: 36.2 {NULL, SECONDS} (ref 21–34)
AST SERPL-CCNC: 109 {NULL, U/L} (ref 17–59)
BACTERIA #/AREA URNS HPF: (no result) {NULL, NULL}
BASOPHILS # BLD AUTO: 0 {NULL, K/UL} (ref 0–0.2)
BASOPHILS NFR BLD: 0.2 {NULL, %} (ref 0–2)
BILIRUB UR-MCNC: NEGATIVE {NULL, NULL}
BUN SERPL-MCNC: 10 {NULL, MG/DL} (ref 9–20)
CALCIUM SERPL-MCNC: 9.1 {NULL, MG/DL} (ref 8.6–10.4)
CK MB SERPL-MCNC: 11.3 {NULL, NG/ML} (ref 0–3.38)
EOSINOPHIL # BLD AUTO: 0 {NULL, K/UL} (ref 0–0.7)
EOSINOPHIL NFR BLD: 0 {NULL, %} (ref 0–4)
ERYTHROCYTE [DISTWIDTH] IN BLOOD BY AUTOMATED COUNT: 16.8 {NULL, %} (ref 11.5–14.5)
GFR NON-AFRICAN AMERICAN: > 60 {NULL, NULL}
GLUCOSE UR STRIP-MCNC: (no result) {NULL, MG/DL}
HGB BLD-MCNC: 15.3 {NULL, G/DL} (ref 12–18)
INR PPP: 1.1 {NULL, NULL}
LEUKOCYTE ESTERASE UR-ACNC: (no result) {NULL, LEU/UL}
LYMPHOCYTES # BLD AUTO: 1.3 {NULL, K/UL} (ref 1–4.3)
LYMPHOCYTES NFR BLD AUTO: 11.3 {NULL, %} (ref 20–40)
MCH RBC QN AUTO: 29.6 {NULL, PG} (ref 27–31)
MCHC RBC AUTO-ENTMCNC: 33.9 {NULL, G/DL} (ref 33–37)
MCV RBC AUTO: 87.4 {NULL, FL} (ref 80–94)
MONOCYTES # BLD: 0.6 {NULL, K/UL} (ref 0–0.8)
MONOCYTES NFR BLD: 4.9 {NULL, %} (ref 0–10)
NEUTROPHILS # BLD: 9.5 {NULL, K/UL} (ref 1.8–7)
NEUTROPHILS NFR BLD AUTO: 83.6 {NULL, %} (ref 50–75)
NRBC BLD AUTO-RTO: 0.1 {NULL, %} (ref 0–2)
PH UR STRIP: 6 {NULL, NULL} (ref 5–8)
PLATELET # BLD: 361 {NULL, K/UL} (ref 130–400)
PMV BLD AUTO: 7.8 {NULL, FL} (ref 7.2–11.7)
PROT UR STRIP-MCNC: NEGATIVE {NULL, MG/DL}
PROTHROMBIN TIME: 12.3 {NULL, SECONDS} (ref 9.7–12.2)
RBC # BLD AUTO: 5.16 {NULL, MIL/UL} (ref 4.4–5.9)
RBC # UR STRIP: (no result) {NULL, NULL}
SP GR UR STRIP: 1.01 {NULL, NULL} (ref 1–1.03)
SQUAMOUS EPITHIAL: < 1 {NULL, /HPF} (ref 0–5)
UROBILINOGEN UR-MCNC: NORMAL {NULL, MG/DL} (ref 0.2–1)
WBC # BLD AUTO: 11.4 {NULL, K/UL} (ref 4.8–10.8)

## 2019-05-31 RX ADMIN — HUMAN INSULIN SCH UNITS: 100 INJECTION, SOLUTION SUBCUTANEOUS at 17:45

## 2019-05-31 RX ADMIN — HUMAN INSULIN SCH: 100 INJECTION, SOLUTION SUBCUTANEOUS at 21:21

## 2019-05-31 RX ADMIN — FOLIC ACID SCH MLS/HR: 5 INJECTION, SOLUTION INTRAMUSCULAR; INTRAVENOUS; SUBCUTANEOUS at 18:29

## 2019-06-01 LAB
ALBUMIN SERPL-MCNC: 4.3 {NULL, G/DL} (ref 3.5–5)
ALBUMIN/GLOB SERPL: 1.9 {NULL, NULL} (ref 1–2.1)
ALT SERPL-CCNC: 48 {NULL, U/L} (ref 21–72)
AST SERPL-CCNC: 100 {NULL, U/L} (ref 17–59)
BASOPHILS # BLD AUTO: 0 {NULL, K/UL} (ref 0–0.2)
BASOPHILS NFR BLD: 0.3 {NULL, %} (ref 0–2)
BUN SERPL-MCNC: 10 {NULL, MG/DL} (ref 9–20)
CALCIUM SERPL-MCNC: 8.5 {NULL, MG/DL} (ref 8.6–10.4)
EOSINOPHIL # BLD AUTO: 0 {NULL, K/UL} (ref 0–0.7)
EOSINOPHIL NFR BLD: 0.7 {NULL, %} (ref 0–4)
ERYTHROCYTE [DISTWIDTH] IN BLOOD BY AUTOMATED COUNT: 16.4 {NULL, %} (ref 11.5–14.5)
GFR NON-AFRICAN AMERICAN: > 60 {NULL, NULL}
HGB BLD-MCNC: 14 {NULL, G/DL} (ref 12–18)
LYMPHOCYTES # BLD AUTO: 1.4 {NULL, K/UL} (ref 1–4.3)
LYMPHOCYTES NFR BLD AUTO: 24.1 {NULL, %} (ref 20–40)
MCH RBC QN AUTO: 29.5 {NULL, PG} (ref 27–31)
MCHC RBC AUTO-ENTMCNC: 34.3 {NULL, G/DL} (ref 33–37)
MCV RBC AUTO: 86 {NULL, FL} (ref 80–94)
MONOCYTES # BLD: 0.4 {NULL, K/UL} (ref 0–0.8)
MONOCYTES NFR BLD: 7 {NULL, %} (ref 0–10)
NEUTROPHILS # BLD: 3.9 {NULL, K/UL} (ref 1.8–7)
NEUTROPHILS NFR BLD AUTO: 67.9 {NULL, %} (ref 50–75)
NRBC BLD AUTO-RTO: 0 {NULL, %} (ref 0–2)
PLATELET # BLD: 241 {NULL, K/UL} (ref 130–400)
PMV BLD AUTO: 7.9 {NULL, FL} (ref 7.2–11.7)
RBC # BLD AUTO: 4.75 {NULL, MIL/UL} (ref 4.4–5.9)
WBC # BLD AUTO: 5.7 {NULL, K/UL} (ref 4.8–10.8)

## 2019-06-01 RX ADMIN — HUMAN INSULIN SCH UNITS: 100 INJECTION, SOLUTION SUBCUTANEOUS at 12:26

## 2019-06-01 RX ADMIN — HUMAN INSULIN SCH: 100 INJECTION, SOLUTION SUBCUTANEOUS at 17:37

## 2019-06-01 RX ADMIN — Medication SCH TAB: at 09:21

## 2019-06-01 RX ADMIN — FOLIC ACID SCH MLS/HR: 5 INJECTION, SOLUTION INTRAMUSCULAR; INTRAVENOUS; SUBCUTANEOUS at 17:32

## 2019-06-01 RX ADMIN — HUMAN INSULIN SCH: 100 INJECTION, SOLUTION SUBCUTANEOUS at 21:30

## 2019-06-01 RX ADMIN — HUMAN INSULIN SCH: 100 INJECTION, SOLUTION SUBCUTANEOUS at 07:18

## 2019-06-02 RX ADMIN — FOLIC ACID SCH MLS/HR: 5 INJECTION, SOLUTION INTRAMUSCULAR; INTRAVENOUS; SUBCUTANEOUS at 18:51

## 2019-06-02 RX ADMIN — Medication SCH TAB: at 10:00

## 2019-06-02 RX ADMIN — HUMAN INSULIN SCH: 100 INJECTION, SOLUTION SUBCUTANEOUS at 17:30

## 2019-06-02 RX ADMIN — HUMAN INSULIN SCH UNITS: 100 INJECTION, SOLUTION SUBCUTANEOUS at 08:12

## 2019-06-02 RX ADMIN — HUMAN INSULIN SCH UNITS: 100 INJECTION, SOLUTION SUBCUTANEOUS at 12:14

## 2019-06-02 RX ADMIN — HUMAN INSULIN SCH: 100 INJECTION, SOLUTION SUBCUTANEOUS at 21:39

## 2019-06-03 VITALS — HEART RATE: 91 BPM

## 2019-06-03 VITALS — SYSTOLIC BLOOD PRESSURE: 144 MMHG | TEMPERATURE: 97.2 F | DIASTOLIC BLOOD PRESSURE: 92 MMHG | OXYGEN SATURATION: 96 %

## 2019-06-03 LAB
ALBUMIN SERPL-MCNC: 4 {NULL, G/DL} (ref 3.5–5)
ALBUMIN/GLOB SERPL: 1.3 {NULL, NULL} (ref 1–2.1)
ALT SERPL-CCNC: 56 {NULL, U/L} (ref 21–72)
AST SERPL-CCNC: 54 {NULL, U/L} (ref 17–59)
BASOPHILS # BLD AUTO: 0 {NULL, K/UL} (ref 0–0.2)
BASOPHILS NFR BLD: 0.3 {NULL, %} (ref 0–2)
BUN SERPL-MCNC: 12 {NULL, MG/DL} (ref 9–20)
CALCIUM SERPL-MCNC: 9.3 {NULL, MG/DL} (ref 8.6–10.4)
EOSINOPHIL # BLD AUTO: 0.2 {NULL, K/UL} (ref 0–0.7)
EOSINOPHIL NFR BLD: 2.3 {NULL, %} (ref 0–4)
ERYTHROCYTE [DISTWIDTH] IN BLOOD BY AUTOMATED COUNT: 16.1 {NULL, %} (ref 11.5–14.5)
GFR NON-AFRICAN AMERICAN: > 60 {NULL, NULL}
HGB BLD-MCNC: 15 {NULL, G/DL} (ref 12–18)
LYMPHOCYTES # BLD AUTO: 1.5 {NULL, K/UL} (ref 1–4.3)
LYMPHOCYTES NFR BLD AUTO: 18.2 {NULL, %} (ref 20–40)
MCH RBC QN AUTO: 29.4 {NULL, PG} (ref 27–31)
MCHC RBC AUTO-ENTMCNC: 33.8 {NULL, G/DL} (ref 33–37)
MCV RBC AUTO: 87.1 {NULL, FL} (ref 80–94)
MONOCYTES # BLD: 0.5 {NULL, K/UL} (ref 0–0.8)
MONOCYTES NFR BLD: 6.1 {NULL, %} (ref 0–10)
NEUTROPHILS # BLD: 5.9 {NULL, K/UL} (ref 1.8–7)
NEUTROPHILS NFR BLD AUTO: 73.1 {NULL, %} (ref 50–75)
NRBC BLD AUTO-RTO: 0.1 {NULL, %} (ref 0–2)
PLATELET # BLD: 270 {NULL, K/UL} (ref 130–400)
PMV BLD AUTO: 8.1 {NULL, FL} (ref 7.2–11.7)
RBC # BLD AUTO: 5.11 {NULL, MIL/UL} (ref 4.4–5.9)
WBC # BLD AUTO: 8 {NULL, K/UL} (ref 4.8–10.8)

## 2019-06-03 RX ADMIN — FOLIC ACID SCH MLS/HR: 5 INJECTION, SOLUTION INTRAMUSCULAR; INTRAVENOUS; SUBCUTANEOUS at 17:22

## 2019-06-03 RX ADMIN — HUMAN INSULIN SCH UNITS: 100 INJECTION, SOLUTION SUBCUTANEOUS at 11:22

## 2019-06-03 RX ADMIN — HUMAN INSULIN SCH UNITS: 100 INJECTION, SOLUTION SUBCUTANEOUS at 17:01

## 2019-06-03 RX ADMIN — Medication SCH TAB: at 10:15

## 2019-06-03 RX ADMIN — HUMAN INSULIN SCH UNITS: 100 INJECTION, SOLUTION SUBCUTANEOUS at 08:04
